# Patient Record
Sex: FEMALE | Race: WHITE
[De-identification: names, ages, dates, MRNs, and addresses within clinical notes are randomized per-mention and may not be internally consistent; named-entity substitution may affect disease eponyms.]

---

## 2017-01-23 ENCOUNTER — HOSPITAL ENCOUNTER (INPATIENT)
Dept: HOSPITAL 45 - C.EDB | Age: 27
LOS: 29 days | Discharge: OTHER INSTITUTION | DRG: 885 | End: 2017-02-21
Attending: PSYCHIATRY & NEUROLOGY | Admitting: PSYCHIATRY & NEUROLOGY
Payer: COMMERCIAL

## 2017-01-23 VITALS
WEIGHT: 131.62 LBS | WEIGHT: 131.62 LBS | HEIGHT: 60.98 IN | BODY MASS INDEX: 24.85 KG/M2 | HEIGHT: 60.98 IN | BODY MASS INDEX: 24.85 KG/M2

## 2017-01-23 VITALS — OXYGEN SATURATION: 97 %
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
## 2017-01-23 DIAGNOSIS — T24.019A: ICD-10-CM

## 2017-01-23 DIAGNOSIS — F43.10: ICD-10-CM

## 2017-01-23 DIAGNOSIS — T76.21XA: ICD-10-CM

## 2017-01-23 DIAGNOSIS — F10.21: ICD-10-CM
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
## 2017-01-23 DIAGNOSIS — F33.9: Primary | ICD-10-CM

## 2017-01-23 DIAGNOSIS — E03.9: ICD-10-CM

## 2017-01-23 DIAGNOSIS — F17.200: ICD-10-CM
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
## 2017-01-23 DIAGNOSIS — R45.851: ICD-10-CM

## 2017-01-23 LAB
ALBUMIN/GLOB SERPL: 1.4 {RATIO} (ref 0.9–2)
ALP SERPL-CCNC: 47 U/L (ref 45–117)
ALT SERPL-CCNC: 15 U/L (ref 12–78)
ANION GAP SERPL CALC-SCNC: 14 MMOL/L (ref 3–11)
APPEARANCE UR: CLEAR
AST SERPL-CCNC: 13 U/L (ref 15–37)
BASOPHILS # BLD: 0.02 K/UL (ref 0–0.2)
BASOPHILS NFR BLD: 0.3 %
BENZODIAZ UR-MCNC: (no result) UG/L
BILIRUB UR-MCNC: (no result) MG/DL
BUN SERPL-MCNC: 7 MG/DL (ref 7–18)
BUN/CREAT SERPL: 10.3 (ref 10–20)
CALCIUM SERPL-MCNC: 9 MG/DL (ref 8.5–10.1)
CHLORIDE SERPL-SCNC: 105 MMOL/L (ref 98–107)
CO2 SERPL-SCNC: 22 MMOL/L (ref 21–32)
COLOR UR: YELLOW
COMPLETE: YES
CREAT CL PREDICTED SERPL C-G-VRATE: 98.1 ML/MIN
CREAT SERPL-MCNC: 0.72 MG/DL (ref 0.6–1.2)
EOSINOPHIL NFR BLD AUTO: 194 K/UL (ref 130–400)
GLOBULIN SER-MCNC: 2.9 GM/DL (ref 2.5–4)
GLUCOSE SERPL-MCNC: 84 MG/DL (ref 70–99)
HCT VFR BLD CALC: 40.2 % (ref 37–47)
IG%: 0.3 %
IMM GRANULOCYTES NFR BLD AUTO: 23 %
LYMPHOCYTES # BLD: 1.74 K/UL (ref 1.2–3.4)
MANUAL MICROSCOPIC REQUIRED?: NO
MCH RBC QN AUTO: 31.8 PG (ref 25–34)
MCHC RBC AUTO-ENTMCNC: 34.6 G/DL (ref 32–36)
MCV RBC AUTO: 92 FL (ref 80–100)
MONOCYTES NFR BLD: 4.5 %
NEUTROPHILS # BLD AUTO: 1.1 %
NEUTROPHILS NFR BLD AUTO: 70.8 %
NITRITE UR QL STRIP: (no result)
PCP UR-MCNC: (no result) UG/L
PH UR STRIP: 6.5 [PH] (ref 4.5–7.5)
PMV BLD AUTO: 9.2 FL (ref 7.4–10.4)
POTASSIUM SERPL-SCNC: 3.7 MMOL/L (ref 3.5–5.1)
PREG INTERNAL NEGATIVE QC: (no result)
PREG INTERNAL POSITIVE QC: (no result)
RBC # BLD AUTO: 4.37 M/UL (ref 4.2–5.4)
REVIEW REQ?: NO
SODIUM SERPL-SCNC: 141 MMOL/L (ref 136–145)
SP GR UR STRIP: 1.01 (ref 1–1.03)
TSH SERPL-ACNC: 0.73 UIU/ML (ref 0.3–4.5)
URINE BILL WITH OR WITHOUT MIC: (no result)
UROBILINOGEN UR-MCNC: (no result) MG/DL
WBC # BLD AUTO: 7.56 K/UL (ref 4.8–10.8)

## 2017-01-23 RX ADMIN — QUETIAPINE SCH MG: 200 TABLET, FILM COATED ORAL at 23:59
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
## 2017-01-24 VITALS — HEART RATE: 96 BPM | SYSTOLIC BLOOD PRESSURE: 101 MMHG | DIASTOLIC BLOOD PRESSURE: 67 MMHG | TEMPERATURE: 98.24 F

## 2017-01-24 VITALS — DIASTOLIC BLOOD PRESSURE: 76 MMHG | SYSTOLIC BLOOD PRESSURE: 123 MMHG | TEMPERATURE: 98.24 F | HEART RATE: 108 BPM

## 2017-01-24 RX ADMIN — BACITRACIN ZINC, NEOMYCIN, POLYMYXIN B SCH APPLN: 400; 3.5; 5 OINTMENT TOPICAL at 21:55

## 2017-01-24 RX ADMIN — LORAZEPAM PRN MG: 0.5 TABLET ORAL at 21:55

## 2017-01-24 RX ADMIN — ALUMINUM ZIRCONIUM TRICHLOROHYDREX GLY SCH EA: 0.2 STICK TOPICAL at 08:00

## 2017-01-24 RX ADMIN — NICOTINE SCH PATCH: 21 PATCH, EXTENDED RELEASE TRANSDERMAL at 07:59

## 2017-01-24 RX ADMIN — LEVOTHYROXINE SODIUM SCH MCG: 75 TABLET ORAL at 07:58

## 2017-01-24 RX ADMIN — ALUMINUM ZIRCONIUM TRICHLOROHYDREX GLY SCH EA: 0.2 STICK TOPICAL at 00:00

## 2017-01-24 RX ADMIN — BACITRACIN ZINC, NEOMYCIN, POLYMYXIN B SCH APPLN: 400; 3.5; 5 OINTMENT TOPICAL at 07:59

## 2017-01-24 RX ADMIN — LORAZEPAM PRN MG: 0.5 TABLET ORAL at 11:11

## 2017-01-24 RX ADMIN — QUETIAPINE SCH MG: 200 TABLET, FILM COATED ORAL at 21:54

## 2017-01-24 RX ADMIN — ALUMINUM ZIRCONIUM TRICHLOROHYDREX GLY SCH EA: 0.2 STICK TOPICAL at 15:29

## 2017-01-24 RX ADMIN — LACTASE TAB 3000 UNIT PRN UNITS: 3000 TAB at 12:52

## 2017-01-24 RX ADMIN — NICOTINE POLACRILEX PRN PIECE: 2 GUM, CHEWING ORAL at 13:37

## 2017-01-24 RX ADMIN — LORAZEPAM PRN MG: 0.5 TABLET ORAL at 16:03
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
## 2017-01-25 VITALS — TEMPERATURE: 98.24 F | DIASTOLIC BLOOD PRESSURE: 69 MMHG | HEART RATE: 116 BPM | SYSTOLIC BLOOD PRESSURE: 111 MMHG

## 2017-01-25 RX ADMIN — LACTASE TAB 3000 UNIT PRN UNITS: 3000 TAB at 17:41

## 2017-01-25 RX ADMIN — NALTREXONE HYDROCHLORIDE SCH MG: 50 TABLET, FILM COATED ORAL at 21:40

## 2017-01-25 RX ADMIN — ALUMINUM ZIRCONIUM TRICHLOROHYDREX GLY SCH EA: 0.2 STICK TOPICAL at 07:53

## 2017-01-25 RX ADMIN — BACITRACIN ZINC, NEOMYCIN, POLYMYXIN B SCH APPLN: 400; 3.5; 5 OINTMENT TOPICAL at 08:37

## 2017-01-25 RX ADMIN — ALUMINUM ZIRCONIUM TRICHLOROHYDREX GLY SCH EA: 0.2 STICK TOPICAL at 15:52

## 2017-01-25 RX ADMIN — ALUMINUM ZIRCONIUM TRICHLOROHYDREX GLY SCH EA: 0.2 STICK TOPICAL at 00:00

## 2017-01-25 RX ADMIN — NICOTINE SCH PATCH: 21 PATCH, EXTENDED RELEASE TRANSDERMAL at 08:02

## 2017-01-25 RX ADMIN — QUETIAPINE SCH MG: 200 TABLET, FILM COATED ORAL at 21:40

## 2017-01-25 RX ADMIN — LORAZEPAM PRN MG: 0.5 TABLET ORAL at 14:22

## 2017-01-25 RX ADMIN — LORAZEPAM PRN MG: 0.5 TABLET ORAL at 08:03

## 2017-01-25 RX ADMIN — LORAZEPAM PRN MG: 0.5 TABLET ORAL at 21:40

## 2017-01-25 RX ADMIN — LEVOTHYROXINE SODIUM SCH MCG: 75 TABLET ORAL at 07:46

## 2017-01-25 RX ADMIN — LACTASE TAB 3000 UNIT PRN UNITS: 3000 TAB at 20:45
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
## 2017-01-26 VITALS — DIASTOLIC BLOOD PRESSURE: 61 MMHG | SYSTOLIC BLOOD PRESSURE: 100 MMHG | HEART RATE: 92 BPM | TEMPERATURE: 98.42 F

## 2017-01-26 LAB
CHOLEST/HDLC SERPL: 3.1 {RATIO}
GLUCOSE UR QL: 49 MG/DL
KETONES UR QL STRIP: 88 MG/DL
NITRITE UR QL STRIP: 74 MG/DL (ref 0–150)
PH UR: 152 MG/DL (ref 0–200)
VERY LOW DENSITY LIPOPROT CALC: 15 MG/DL

## 2017-01-26 RX ADMIN — ALUMINUM ZIRCONIUM TRICHLOROHYDREX GLY SCH EA: 0.2 STICK TOPICAL at 15:41

## 2017-01-26 RX ADMIN — NALTREXONE HYDROCHLORIDE SCH MG: 50 TABLET, FILM COATED ORAL at 21:55

## 2017-01-26 RX ADMIN — LEVOTHYROXINE SODIUM SCH MCG: 75 TABLET ORAL at 08:19

## 2017-01-26 RX ADMIN — ALUMINUM ZIRCONIUM TRICHLOROHYDREX GLY SCH EA: 0.2 STICK TOPICAL at 08:00

## 2017-01-26 RX ADMIN — LACTASE TAB 3000 UNIT PRN UNITS: 3000 TAB at 12:53

## 2017-01-26 RX ADMIN — QUETIAPINE SCH MG: 200 TABLET, FILM COATED ORAL at 21:56

## 2017-01-26 RX ADMIN — ALUMINUM ZIRCONIUM TRICHLOROHYDREX GLY SCH EA: 0.2 STICK TOPICAL at 00:00

## 2017-01-26 RX ADMIN — LACTASE TAB 3000 UNIT PRN UNITS: 3000 TAB at 17:27

## 2017-01-26 RX ADMIN — LORAZEPAM PRN MG: 0.5 TABLET ORAL at 10:05

## 2017-01-26 RX ADMIN — BUPROPION HYDROCHLORIDE SCH MG: 150 TABLET, FILM COATED, EXTENDED RELEASE ORAL at 10:04

## 2017-01-26 RX ADMIN — NICOTINE SCH PATCH: 21 PATCH, EXTENDED RELEASE TRANSDERMAL at 10:03
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
## 2017-01-27 VITALS — DIASTOLIC BLOOD PRESSURE: 66 MMHG | HEART RATE: 105 BPM | SYSTOLIC BLOOD PRESSURE: 107 MMHG | TEMPERATURE: 98.42 F

## 2017-01-27 RX ADMIN — LACTASE TAB 3000 UNIT PRN UNITS: 3000 TAB at 13:03

## 2017-01-27 RX ADMIN — LEVOTHYROXINE SODIUM SCH MCG: 75 TABLET ORAL at 08:11

## 2017-01-27 RX ADMIN — BUPROPION HYDROCHLORIDE SCH MG: 150 TABLET, FILM COATED, EXTENDED RELEASE ORAL at 08:53

## 2017-01-27 RX ADMIN — QUETIAPINE SCH MG: 200 TABLET, FILM COATED ORAL at 21:22

## 2017-01-27 RX ADMIN — NICOTINE SCH PATCH: 21 PATCH, EXTENDED RELEASE TRANSDERMAL at 08:53

## 2017-01-27 RX ADMIN — LORAZEPAM PRN MG: 0.5 TABLET ORAL at 14:39

## 2017-01-27 RX ADMIN — LACTASE TAB 3000 UNIT PRN UNITS: 3000 TAB at 17:18
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
## 2017-01-28 VITALS — DIASTOLIC BLOOD PRESSURE: 67 MMHG | SYSTOLIC BLOOD PRESSURE: 100 MMHG | TEMPERATURE: 98.42 F | HEART RATE: 95 BPM

## 2017-01-28 RX ADMIN — LACTASE TAB 3000 UNIT PRN UNITS: 3000 TAB at 20:32

## 2017-01-28 RX ADMIN — BUPROPION HYDROCHLORIDE SCH MG: 150 TABLET, FILM COATED, EXTENDED RELEASE ORAL at 08:02

## 2017-01-28 RX ADMIN — LORAZEPAM PRN MG: 0.5 TABLET ORAL at 10:52

## 2017-01-28 RX ADMIN — LEVOTHYROXINE SODIUM SCH MCG: 75 TABLET ORAL at 07:53

## 2017-01-28 RX ADMIN — QUETIAPINE SCH MG: 200 TABLET, FILM COATED ORAL at 22:03

## 2017-01-28 RX ADMIN — NICOTINE SCH PATCH: 21 PATCH, EXTENDED RELEASE TRANSDERMAL at 07:55

## 2017-01-28 RX ADMIN — NICOTINE POLACRILEX PRN PIECE: 2 GUM, CHEWING ORAL at 18:34

## 2017-01-28 RX ADMIN — LACTASE TAB 3000 UNIT PRN UNITS: 3000 TAB at 13:44

## 2017-01-28 RX ADMIN — NALTREXONE HYDROCHLORIDE SCH MG: 50 TABLET, FILM COATED ORAL at 22:06
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
## 2017-01-29 VITALS — HEART RATE: 90 BPM | TEMPERATURE: 98.6 F | DIASTOLIC BLOOD PRESSURE: 58 MMHG | SYSTOLIC BLOOD PRESSURE: 92 MMHG

## 2017-01-29 RX ADMIN — LEVOTHYROXINE SODIUM SCH MCG: 75 TABLET ORAL at 07:47

## 2017-01-29 RX ADMIN — NICOTINE SCH PATCH: 21 PATCH, EXTENDED RELEASE TRANSDERMAL at 08:16

## 2017-01-29 RX ADMIN — NALTREXONE HYDROCHLORIDE SCH MG: 50 TABLET, FILM COATED ORAL at 21:34

## 2017-01-29 RX ADMIN — BUPROPION HYDROCHLORIDE SCH MG: 150 TABLET, FILM COATED, EXTENDED RELEASE ORAL at 08:18

## 2017-01-29 RX ADMIN — LORAZEPAM PRN MG: 0.5 TABLET ORAL at 15:24

## 2017-01-29 RX ADMIN — LACTASE TAB 3000 UNIT PRN UNITS: 3000 TAB at 12:37

## 2017-01-29 RX ADMIN — QUETIAPINE SCH MG: 200 TABLET, FILM COATED ORAL at 21:34

## 2017-01-29 RX ADMIN — LORAZEPAM PRN MG: 0.5 TABLET ORAL at 11:03
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
## 2017-01-30 VITALS — HEART RATE: 91 BPM | TEMPERATURE: 98.42 F | DIASTOLIC BLOOD PRESSURE: 72 MMHG | SYSTOLIC BLOOD PRESSURE: 112 MMHG

## 2017-01-30 RX ADMIN — LEVOTHYROXINE SODIUM SCH MCG: 75 TABLET ORAL at 07:33

## 2017-01-30 RX ADMIN — BUPROPION HYDROCHLORIDE SCH MG: 150 TABLET, FILM COATED, EXTENDED RELEASE ORAL at 07:33

## 2017-01-30 RX ADMIN — NICOTINE SCH PATCH: 21 PATCH, EXTENDED RELEASE TRANSDERMAL at 07:32

## 2017-01-30 RX ADMIN — LORAZEPAM PRN MG: 0.5 TABLET ORAL at 17:38

## 2017-01-30 RX ADMIN — LACTASE TAB 3000 UNIT PRN UNITS: 3000 TAB at 17:34

## 2017-01-30 RX ADMIN — QUETIAPINE SCH MG: 200 TABLET, FILM COATED ORAL at 21:16

## 2017-01-30 RX ADMIN — NALTREXONE HYDROCHLORIDE SCH MG: 50 TABLET, FILM COATED ORAL at 21:15
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
## 2017-01-31 VITALS — TEMPERATURE: 98.42 F | SYSTOLIC BLOOD PRESSURE: 138 MMHG | HEART RATE: 103 BPM | DIASTOLIC BLOOD PRESSURE: 75 MMHG

## 2017-01-31 RX ADMIN — NALTREXONE HYDROCHLORIDE SCH MG: 50 TABLET, FILM COATED ORAL at 21:24

## 2017-01-31 RX ADMIN — LEVOTHYROXINE SODIUM SCH MCG: 75 TABLET ORAL at 08:12

## 2017-01-31 RX ADMIN — QUETIAPINE SCH MG: 200 TABLET, FILM COATED ORAL at 21:24

## 2017-01-31 RX ADMIN — LACTASE TAB 3000 UNIT PRN UNITS: 3000 TAB at 20:26

## 2017-01-31 RX ADMIN — NICOTINE POLACRILEX PRN PIECE: 2 GUM, CHEWING ORAL at 14:57

## 2017-01-31 RX ADMIN — NICOTINE SCH PATCH: 21 PATCH, EXTENDED RELEASE TRANSDERMAL at 08:11

## 2017-01-31 RX ADMIN — LORAZEPAM PRN MG: 0.5 TABLET ORAL at 17:36

## 2017-01-31 RX ADMIN — LACTASE TAB 3000 UNIT PRN UNITS: 3000 TAB at 12:42

## 2017-01-31 RX ADMIN — BUPROPION HYDROCHLORIDE SCH MG: 150 TABLET, FILM COATED, EXTENDED RELEASE ORAL at 08:14
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
## 2017-02-01 VITALS — TEMPERATURE: 98.6 F | SYSTOLIC BLOOD PRESSURE: 85 MMHG | DIASTOLIC BLOOD PRESSURE: 61 MMHG | HEART RATE: 85 BPM

## 2017-02-01 RX ADMIN — LACTASE TAB 3000 UNIT PRN UNITS: 3000 TAB at 12:49

## 2017-02-01 RX ADMIN — LACTASE TAB 3000 UNIT PRN UNITS: 3000 TAB at 17:22

## 2017-02-01 RX ADMIN — BUPROPION HYDROCHLORIDE SCH MG: 150 TABLET, FILM COATED, EXTENDED RELEASE ORAL at 08:59

## 2017-02-01 RX ADMIN — NICOTINE POLACRILEX PRN PIECE: 2 GUM, CHEWING ORAL at 17:48

## 2017-02-01 RX ADMIN — NICOTINE SCH PATCH: 21 PATCH, EXTENDED RELEASE TRANSDERMAL at 08:57

## 2017-02-01 RX ADMIN — LORAZEPAM PRN MG: 0.5 TABLET ORAL at 14:38

## 2017-02-01 RX ADMIN — QUETIAPINE SCH MG: 200 TABLET, FILM COATED ORAL at 21:38

## 2017-02-01 RX ADMIN — NALTREXONE HYDROCHLORIDE SCH MG: 50 TABLET, FILM COATED ORAL at 21:38

## 2017-02-01 RX ADMIN — LEVOTHYROXINE SODIUM SCH MCG: 75 TABLET ORAL at 07:45
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
## 2017-02-02 VITALS — SYSTOLIC BLOOD PRESSURE: 99 MMHG | HEART RATE: 99 BPM | TEMPERATURE: 98.06 F | DIASTOLIC BLOOD PRESSURE: 59 MMHG

## 2017-02-02 RX ADMIN — LORAZEPAM PRN MG: 1 TABLET ORAL at 07:44

## 2017-02-02 RX ADMIN — LACTASE TAB 3000 UNIT PRN UNITS: 3000 TAB at 17:18

## 2017-02-02 RX ADMIN — NICOTINE SCH PATCH: 21 PATCH, EXTENDED RELEASE TRANSDERMAL at 08:42

## 2017-02-02 RX ADMIN — QUETIAPINE SCH MG: 200 TABLET, FILM COATED ORAL at 20:55

## 2017-02-02 RX ADMIN — NALTREXONE HYDROCHLORIDE SCH MG: 50 TABLET, FILM COATED ORAL at 20:55

## 2017-02-02 RX ADMIN — LORAZEPAM PRN MG: 1 TABLET ORAL at 14:15

## 2017-02-02 RX ADMIN — LEVOTHYROXINE SODIUM SCH MCG: 75 TABLET ORAL at 07:21

## 2017-02-02 RX ADMIN — BUPROPION HYDROCHLORIDE SCH MG: 150 TABLET, FILM COATED, EXTENDED RELEASE ORAL at 08:44
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
## 2017-02-03 VITALS — HEART RATE: 108 BPM | SYSTOLIC BLOOD PRESSURE: 103 MMHG | TEMPERATURE: 98.78 F | DIASTOLIC BLOOD PRESSURE: 69 MMHG

## 2017-02-03 RX ADMIN — LEVOTHYROXINE SODIUM SCH MCG: 75 TABLET ORAL at 08:01

## 2017-02-03 RX ADMIN — LORAZEPAM PRN MG: 1 TABLET ORAL at 15:40

## 2017-02-03 RX ADMIN — NALTREXONE HYDROCHLORIDE SCH MG: 50 TABLET, FILM COATED ORAL at 21:28

## 2017-02-03 RX ADMIN — QUETIAPINE SCH MG: 200 TABLET, FILM COATED ORAL at 21:28

## 2017-02-03 RX ADMIN — BUPROPION HYDROCHLORIDE SCH MG: 150 TABLET, FILM COATED, EXTENDED RELEASE ORAL at 08:03

## 2017-02-03 RX ADMIN — NICOTINE SCH PATCH: 21 PATCH, EXTENDED RELEASE TRANSDERMAL at 08:03

## 2017-02-03 RX ADMIN — LORAZEPAM PRN MG: 1 TABLET ORAL at 10:48

## 2017-02-03 RX ADMIN — LACTASE TAB 3000 UNIT PRN UNITS: 3000 TAB at 12:59
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
## 2017-02-04 VITALS — HEART RATE: 96 BPM | SYSTOLIC BLOOD PRESSURE: 110 MMHG | DIASTOLIC BLOOD PRESSURE: 71 MMHG | TEMPERATURE: 98.6 F

## 2017-02-04 RX ADMIN — BUPROPION HYDROCHLORIDE SCH MG: 150 TABLET, FILM COATED, EXTENDED RELEASE ORAL at 08:04

## 2017-02-04 RX ADMIN — LORAZEPAM PRN MG: 1 TABLET ORAL at 10:57

## 2017-02-04 RX ADMIN — LORAZEPAM PRN MG: 1 TABLET ORAL at 18:56

## 2017-02-04 RX ADMIN — LEVOTHYROXINE SODIUM SCH MCG: 75 TABLET ORAL at 08:01

## 2017-02-04 RX ADMIN — NICOTINE SCH PATCH: 21 PATCH, EXTENDED RELEASE TRANSDERMAL at 08:03

## 2017-02-04 RX ADMIN — NALTREXONE HYDROCHLORIDE SCH MG: 50 TABLET, FILM COATED ORAL at 21:09

## 2017-02-04 RX ADMIN — LACTASE TAB 3000 UNIT PRN UNITS: 3000 TAB at 17:04

## 2017-02-04 RX ADMIN — QUETIAPINE SCH MG: 200 TABLET, FILM COATED ORAL at 21:10
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
## 2017-02-05 VITALS — TEMPERATURE: 98.78 F | DIASTOLIC BLOOD PRESSURE: 68 MMHG | HEART RATE: 111 BPM | SYSTOLIC BLOOD PRESSURE: 93 MMHG

## 2017-02-05 RX ADMIN — LEVOTHYROXINE SODIUM SCH MCG: 75 TABLET ORAL at 08:24

## 2017-02-05 RX ADMIN — NALTREXONE HYDROCHLORIDE SCH MG: 50 TABLET, FILM COATED ORAL at 21:11

## 2017-02-05 RX ADMIN — LORAZEPAM PRN MG: 1 TABLET ORAL at 13:30

## 2017-02-05 RX ADMIN — LACTASE TAB 3000 UNIT PRN UNITS: 3000 TAB at 17:26

## 2017-02-05 RX ADMIN — BUPROPION HYDROCHLORIDE SCH MG: 150 TABLET, FILM COATED, EXTENDED RELEASE ORAL at 08:25

## 2017-02-05 RX ADMIN — QUETIAPINE SCH MG: 200 TABLET, FILM COATED ORAL at 21:11

## 2017-02-05 RX ADMIN — LACTASE TAB 3000 UNIT PRN UNITS: 3000 TAB at 12:57

## 2017-02-05 RX ADMIN — NICOTINE SCH PATCH: 21 PATCH, EXTENDED RELEASE TRANSDERMAL at 08:24
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
## 2017-02-06 VITALS — DIASTOLIC BLOOD PRESSURE: 77 MMHG | HEART RATE: 90 BPM | SYSTOLIC BLOOD PRESSURE: 111 MMHG | TEMPERATURE: 98.6 F

## 2017-02-06 RX ADMIN — LEVOTHYROXINE SODIUM SCH MCG: 75 TABLET ORAL at 07:46

## 2017-02-06 RX ADMIN — NALTREXONE HYDROCHLORIDE SCH MG: 50 TABLET, FILM COATED ORAL at 21:22

## 2017-02-06 RX ADMIN — NICOTINE SCH PATCH: 21 PATCH, EXTENDED RELEASE TRANSDERMAL at 08:20

## 2017-02-06 RX ADMIN — BUPROPION HYDROCHLORIDE SCH MG: 150 TABLET, FILM COATED, EXTENDED RELEASE ORAL at 08:18

## 2017-02-06 RX ADMIN — QUETIAPINE SCH MG: 200 TABLET, FILM COATED ORAL at 21:21

## 2017-02-06 RX ADMIN — LORAZEPAM PRN MG: 1 TABLET ORAL at 15:23

## 2017-02-06 RX ADMIN — LORAZEPAM PRN MG: 1 TABLET ORAL at 08:23
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
## 2017-02-07 VITALS — SYSTOLIC BLOOD PRESSURE: 104 MMHG | TEMPERATURE: 98.42 F | HEART RATE: 100 BPM | DIASTOLIC BLOOD PRESSURE: 64 MMHG

## 2017-02-07 RX ADMIN — LACTASE TAB 3000 UNIT PRN UNITS: 3000 TAB at 12:47

## 2017-02-07 RX ADMIN — BUPROPION HYDROCHLORIDE SCH MG: 150 TABLET, FILM COATED, EXTENDED RELEASE ORAL at 08:02

## 2017-02-07 RX ADMIN — LEVOTHYROXINE SODIUM SCH MCG: 75 TABLET ORAL at 07:00

## 2017-02-07 RX ADMIN — LACTASE TAB 3000 UNIT PRN UNITS: 3000 TAB at 17:20

## 2017-02-07 RX ADMIN — LORAZEPAM PRN MG: 1 TABLET ORAL at 09:36

## 2017-02-07 RX ADMIN — LORAZEPAM PRN MG: 1 TABLET ORAL at 17:10

## 2017-02-07 RX ADMIN — NALTREXONE HYDROCHLORIDE SCH MG: 50 TABLET, FILM COATED ORAL at 21:16

## 2017-02-07 RX ADMIN — QUETIAPINE SCH MG: 200 TABLET, FILM COATED ORAL at 21:16

## 2017-02-07 RX ADMIN — NICOTINE SCH PATCH: 21 PATCH, EXTENDED RELEASE TRANSDERMAL at 08:02
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
## 2017-02-08 VITALS — TEMPERATURE: 97.52 F | DIASTOLIC BLOOD PRESSURE: 67 MMHG | HEART RATE: 65 BPM | SYSTOLIC BLOOD PRESSURE: 93 MMHG

## 2017-02-08 RX ADMIN — NICOTINE SCH PATCH: 21 PATCH, EXTENDED RELEASE TRANSDERMAL at 08:53

## 2017-02-08 RX ADMIN — LORAZEPAM PRN MG: 1 TABLET ORAL at 10:35

## 2017-02-08 RX ADMIN — LEVOTHYROXINE SODIUM SCH MCG: 75 TABLET ORAL at 07:43

## 2017-02-08 RX ADMIN — NALTREXONE HYDROCHLORIDE SCH MG: 50 TABLET, FILM COATED ORAL at 21:05

## 2017-02-08 RX ADMIN — QUETIAPINE SCH MG: 200 TABLET, FILM COATED ORAL at 21:06

## 2017-02-08 RX ADMIN — LACTASE TAB 3000 UNIT PRN UNITS: 3000 TAB at 12:48

## 2017-02-08 RX ADMIN — BUPROPION HYDROCHLORIDE SCH MG: 150 TABLET, FILM COATED, EXTENDED RELEASE ORAL at 08:55

## 2017-02-08 RX ADMIN — LACTASE TAB 3000 UNIT PRN UNITS: 3000 TAB at 17:24
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
## 2017-02-09 VITALS — TEMPERATURE: 98.24 F | DIASTOLIC BLOOD PRESSURE: 78 MMHG | HEART RATE: 84 BPM | SYSTOLIC BLOOD PRESSURE: 113 MMHG

## 2017-02-09 RX ADMIN — QUETIAPINE SCH MG: 200 TABLET, FILM COATED ORAL at 20:55

## 2017-02-09 RX ADMIN — NALTREXONE HYDROCHLORIDE SCH MG: 50 TABLET, FILM COATED ORAL at 20:54

## 2017-02-09 RX ADMIN — LEVOTHYROXINE SODIUM SCH MCG: 75 TABLET ORAL at 07:42

## 2017-02-09 RX ADMIN — LORAZEPAM PRN MG: 1 TABLET ORAL at 19:06

## 2017-02-09 RX ADMIN — LORAZEPAM PRN MG: 1 TABLET ORAL at 15:06

## 2017-02-09 RX ADMIN — NICOTINE SCH PATCH: 21 PATCH, EXTENDED RELEASE TRANSDERMAL at 07:43

## 2017-02-09 RX ADMIN — BUPROPION HYDROCHLORIDE SCH MG: 150 TABLET, FILM COATED, EXTENDED RELEASE ORAL at 07:44
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
## 2017-02-10 VITALS — DIASTOLIC BLOOD PRESSURE: 70 MMHG | TEMPERATURE: 98.42 F | HEART RATE: 98 BPM | SYSTOLIC BLOOD PRESSURE: 100 MMHG

## 2017-02-10 RX ADMIN — NICOTINE SCH PATCH: 21 PATCH, EXTENDED RELEASE TRANSDERMAL at 08:24

## 2017-02-10 RX ADMIN — LORAZEPAM PRN MG: 1 TABLET ORAL at 17:47

## 2017-02-10 RX ADMIN — QUETIAPINE SCH MG: 200 TABLET, FILM COATED ORAL at 21:16

## 2017-02-10 RX ADMIN — LORAZEPAM PRN MG: 1 TABLET ORAL at 12:28

## 2017-02-10 RX ADMIN — LEVOTHYROXINE SODIUM SCH MCG: 75 TABLET ORAL at 08:24

## 2017-02-10 RX ADMIN — NALTREXONE HYDROCHLORIDE SCH MG: 50 TABLET, FILM COATED ORAL at 21:15

## 2017-02-10 RX ADMIN — BUPROPION HYDROCHLORIDE SCH MG: 150 TABLET, FILM COATED, EXTENDED RELEASE ORAL at 08:24
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
## 2017-02-11 VITALS — HEART RATE: 101 BPM | TEMPERATURE: 98.42 F | DIASTOLIC BLOOD PRESSURE: 72 MMHG | SYSTOLIC BLOOD PRESSURE: 104 MMHG

## 2017-02-11 RX ADMIN — NALTREXONE HYDROCHLORIDE SCH MG: 50 TABLET, FILM COATED ORAL at 21:31

## 2017-02-11 RX ADMIN — LEVOTHYROXINE SODIUM SCH MCG: 75 TABLET ORAL at 07:52

## 2017-02-11 RX ADMIN — LACTASE TAB 3000 UNIT PRN UNITS: 3000 TAB at 17:21

## 2017-02-11 RX ADMIN — LORAZEPAM PRN MG: 1 TABLET ORAL at 21:31

## 2017-02-11 RX ADMIN — LORAZEPAM PRN MG: 1 TABLET ORAL at 09:03

## 2017-02-11 RX ADMIN — QUETIAPINE SCH MG: 200 TABLET, FILM COATED ORAL at 21:37

## 2017-02-11 RX ADMIN — NICOTINE SCH PATCH: 21 PATCH, EXTENDED RELEASE TRANSDERMAL at 09:01

## 2017-02-11 RX ADMIN — BUPROPION HYDROCHLORIDE SCH MG: 150 TABLET, FILM COATED, EXTENDED RELEASE ORAL at 09:02
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
## 2017-02-12 VITALS — HEART RATE: 90 BPM | SYSTOLIC BLOOD PRESSURE: 112 MMHG | TEMPERATURE: 98.24 F | DIASTOLIC BLOOD PRESSURE: 77 MMHG

## 2017-02-12 RX ADMIN — LORAZEPAM PRN MG: 1 TABLET ORAL at 09:17

## 2017-02-12 RX ADMIN — BUPROPION HYDROCHLORIDE SCH MG: 150 TABLET, FILM COATED, EXTENDED RELEASE ORAL at 08:43

## 2017-02-12 RX ADMIN — LEVOTHYROXINE SODIUM SCH MCG: 75 TABLET ORAL at 08:42

## 2017-02-12 RX ADMIN — NALTREXONE HYDROCHLORIDE SCH MG: 50 TABLET, FILM COATED ORAL at 21:20

## 2017-02-12 RX ADMIN — NICOTINE SCH PATCH: 21 PATCH, EXTENDED RELEASE TRANSDERMAL at 08:42

## 2017-02-12 RX ADMIN — LORAZEPAM PRN MG: 1 TABLET ORAL at 17:32
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
## 2017-02-13 VITALS — HEART RATE: 97 BPM | TEMPERATURE: 98.24 F | SYSTOLIC BLOOD PRESSURE: 104 MMHG | DIASTOLIC BLOOD PRESSURE: 72 MMHG

## 2017-02-13 RX ADMIN — LACTASE TAB 3000 UNIT PRN UNITS: 3000 TAB at 17:17

## 2017-02-13 RX ADMIN — NICOTINE SCH PATCH: 21 PATCH, EXTENDED RELEASE TRANSDERMAL at 08:31

## 2017-02-13 RX ADMIN — LACTASE TAB 3000 UNIT PRN UNITS: 3000 TAB at 12:42

## 2017-02-13 RX ADMIN — BUPROPION HYDROCHLORIDE SCH MG: 150 TABLET, FILM COATED, EXTENDED RELEASE ORAL at 08:32

## 2017-02-13 RX ADMIN — LEVOTHYROXINE SODIUM SCH MCG: 75 TABLET ORAL at 07:41

## 2017-02-13 RX ADMIN — NALTREXONE HYDROCHLORIDE SCH MG: 50 TABLET, FILM COATED ORAL at 21:01

## 2017-02-13 RX ADMIN — LORAZEPAM PRN MG: 1 TABLET ORAL at 19:36

## 2017-02-13 RX ADMIN — QUETIAPINE SCH MG: 200 TABLET, FILM COATED ORAL at 21:02
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
## 2017-02-14 VITALS — HEART RATE: 101 BPM | SYSTOLIC BLOOD PRESSURE: 105 MMHG | TEMPERATURE: 98.24 F | DIASTOLIC BLOOD PRESSURE: 69 MMHG

## 2017-02-14 RX ADMIN — LACTASE TAB 3000 UNIT PRN UNITS: 3000 TAB at 12:57

## 2017-02-14 RX ADMIN — LEVOTHYROXINE SODIUM SCH MCG: 75 TABLET ORAL at 08:04

## 2017-02-14 RX ADMIN — LORAZEPAM PRN MG: 1 TABLET ORAL at 17:15

## 2017-02-14 RX ADMIN — NALTREXONE HYDROCHLORIDE SCH MG: 50 TABLET, FILM COATED ORAL at 21:44

## 2017-02-14 RX ADMIN — BUPROPION HYDROCHLORIDE SCH MG: 300 TABLET, FILM COATED, EXTENDED RELEASE ORAL at 08:05

## 2017-02-14 RX ADMIN — QUETIAPINE SCH MG: 200 TABLET, FILM COATED ORAL at 21:43

## 2017-02-14 RX ADMIN — NICOTINE SCH PATCH: 21 PATCH, EXTENDED RELEASE TRANSDERMAL at 08:05

## 2017-02-14 RX ADMIN — LACTASE TAB 3000 UNIT PRN UNITS: 3000 TAB at 17:58

## 2017-02-14 RX ADMIN — LORAZEPAM PRN MG: 1 TABLET ORAL at 09:11
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
## 2017-02-15 VITALS — HEART RATE: 83 BPM | TEMPERATURE: 98.24 F | DIASTOLIC BLOOD PRESSURE: 66 MMHG | SYSTOLIC BLOOD PRESSURE: 103 MMHG

## 2017-02-15 RX ADMIN — NALTREXONE HYDROCHLORIDE SCH MG: 50 TABLET, FILM COATED ORAL at 21:40

## 2017-02-15 RX ADMIN — NICOTINE SCH PATCH: 21 PATCH, EXTENDED RELEASE TRANSDERMAL at 08:44

## 2017-02-15 RX ADMIN — BUPROPION HYDROCHLORIDE SCH MG: 300 TABLET, FILM COATED, EXTENDED RELEASE ORAL at 08:44

## 2017-02-15 RX ADMIN — LORAZEPAM PRN MG: 1 TABLET ORAL at 11:02

## 2017-02-15 RX ADMIN — QUETIAPINE SCH MG: 200 TABLET, FILM COATED ORAL at 21:40

## 2017-02-15 RX ADMIN — LEVOTHYROXINE SODIUM SCH MCG: 75 TABLET ORAL at 07:41

## 2017-02-15 RX ADMIN — LORAZEPAM PRN MG: 1 TABLET ORAL at 16:02
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
## 2017-02-16 VITALS — SYSTOLIC BLOOD PRESSURE: 89 MMHG | HEART RATE: 99 BPM | DIASTOLIC BLOOD PRESSURE: 61 MMHG | TEMPERATURE: 98.24 F

## 2017-02-16 RX ADMIN — NALTREXONE HYDROCHLORIDE SCH MG: 50 TABLET, FILM COATED ORAL at 21:34

## 2017-02-16 RX ADMIN — BUPROPION HYDROCHLORIDE SCH MG: 300 TABLET, FILM COATED, EXTENDED RELEASE ORAL at 08:46

## 2017-02-16 RX ADMIN — LEVOTHYROXINE SODIUM SCH MCG: 75 TABLET ORAL at 08:46

## 2017-02-16 RX ADMIN — NICOTINE SCH PATCH: 21 PATCH, EXTENDED RELEASE TRANSDERMAL at 08:46

## 2017-02-16 RX ADMIN — QUETIAPINE SCH MG: 200 TABLET, FILM COATED ORAL at 21:34

## 2017-02-16 RX ADMIN — LORAZEPAM PRN MG: 1 TABLET ORAL at 11:51
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
## 2017-02-17 VITALS — SYSTOLIC BLOOD PRESSURE: 96 MMHG | DIASTOLIC BLOOD PRESSURE: 56 MMHG | TEMPERATURE: 98.42 F | HEART RATE: 83 BPM

## 2017-02-17 RX ADMIN — LORAZEPAM PRN MG: 1 TABLET ORAL at 13:39

## 2017-02-17 RX ADMIN — QUETIAPINE SCH MG: 200 TABLET, FILM COATED ORAL at 21:27

## 2017-02-17 RX ADMIN — LACTASE TAB 3000 UNIT PRN UNITS: 3000 TAB at 13:02

## 2017-02-17 RX ADMIN — BUPROPION HYDROCHLORIDE SCH MG: 150 TABLET, FILM COATED, EXTENDED RELEASE ORAL at 08:10

## 2017-02-17 RX ADMIN — NICOTINE SCH PATCH: 21 PATCH, EXTENDED RELEASE TRANSDERMAL at 08:08

## 2017-02-17 RX ADMIN — LACTASE TAB 3000 UNIT PRN UNITS: 3000 TAB at 17:13

## 2017-02-17 RX ADMIN — LEVOTHYROXINE SODIUM SCH MCG: 75 TABLET ORAL at 08:08
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
## 2017-02-18 VITALS — SYSTOLIC BLOOD PRESSURE: 100 MMHG | TEMPERATURE: 97.7 F | HEART RATE: 76 BPM | DIASTOLIC BLOOD PRESSURE: 64 MMHG

## 2017-02-18 RX ADMIN — LACTASE TAB 3000 UNIT PRN UNITS: 3000 TAB at 13:05

## 2017-02-18 RX ADMIN — BUPROPION HYDROCHLORIDE SCH MG: 150 TABLET, FILM COATED, EXTENDED RELEASE ORAL at 08:42

## 2017-02-18 RX ADMIN — NICOTINE SCH PATCH: 21 PATCH, EXTENDED RELEASE TRANSDERMAL at 08:42

## 2017-02-18 RX ADMIN — LEVOTHYROXINE SODIUM SCH MCG: 75 TABLET ORAL at 08:42

## 2017-02-18 RX ADMIN — LORAZEPAM PRN MG: 1 TABLET ORAL at 13:28

## 2017-02-18 RX ADMIN — LORAZEPAM PRN MG: 1 TABLET ORAL at 17:56

## 2017-02-18 RX ADMIN — QUETIAPINE FUMARATE SCH MG: 300 TABLET, FILM COATED ORAL at 21:25
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
## 2017-02-19 VITALS — HEART RATE: 101 BPM | DIASTOLIC BLOOD PRESSURE: 70 MMHG | TEMPERATURE: 98.24 F | SYSTOLIC BLOOD PRESSURE: 107 MMHG

## 2017-02-19 RX ADMIN — LACTASE TAB 3000 UNIT PRN UNITS: 3000 TAB at 17:17

## 2017-02-19 RX ADMIN — BUPROPION HYDROCHLORIDE SCH MG: 150 TABLET, FILM COATED, EXTENDED RELEASE ORAL at 08:53

## 2017-02-19 RX ADMIN — LORAZEPAM PRN MG: 1 TABLET ORAL at 13:55

## 2017-02-19 RX ADMIN — NICOTINE SCH PATCH: 21 PATCH, EXTENDED RELEASE TRANSDERMAL at 08:54

## 2017-02-19 RX ADMIN — LORAZEPAM PRN MG: 1 TABLET ORAL at 21:07

## 2017-02-19 RX ADMIN — QUETIAPINE FUMARATE SCH MG: 300 TABLET, FILM COATED ORAL at 21:07

## 2017-02-19 RX ADMIN — LEVOTHYROXINE SODIUM SCH MCG: 75 TABLET ORAL at 08:53
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
## 2017-02-20 VITALS — SYSTOLIC BLOOD PRESSURE: 104 MMHG | HEART RATE: 87 BPM | DIASTOLIC BLOOD PRESSURE: 67 MMHG | TEMPERATURE: 98.24 F

## 2017-02-20 RX ADMIN — LEVOTHYROXINE SODIUM SCH MCG: 75 TABLET ORAL at 07:55

## 2017-02-20 RX ADMIN — LORAZEPAM PRN MG: 1 TABLET ORAL at 22:57

## 2017-02-20 RX ADMIN — QUETIAPINE FUMARATE SCH MG: 300 TABLET, FILM COATED ORAL at 21:35

## 2017-02-20 RX ADMIN — BUPROPION HYDROCHLORIDE SCH MG: 300 TABLET, FILM COATED, EXTENDED RELEASE ORAL at 08:48

## 2017-02-20 RX ADMIN — NICOTINE SCH PATCH: 21 PATCH, EXTENDED RELEASE TRANSDERMAL at 08:48
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
## 2017-02-21 VITALS — TEMPERATURE: 98.24 F | DIASTOLIC BLOOD PRESSURE: 66 MMHG | HEART RATE: 76 BPM | SYSTOLIC BLOOD PRESSURE: 108 MMHG

## 2017-02-21 RX ADMIN — LACTASE TAB 3000 UNIT PRN UNITS: 3000 TAB at 13:09

## 2017-02-21 RX ADMIN — LEVOTHYROXINE SODIUM SCH MCG: 75 TABLET ORAL at 08:16

## 2017-02-21 RX ADMIN — NICOTINE SCH PATCH: 21 PATCH, EXTENDED RELEASE TRANSDERMAL at 08:16

## 2017-02-21 RX ADMIN — BUPROPION HYDROCHLORIDE SCH MG: 300 TABLET, FILM COATED, EXTENDED RELEASE ORAL at 08:16

## 2017-02-21 NOTE — DISCHARGE INSTRUCTIONS
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 approved Prescription:  patient refused





Problem Qualifiers





(1) Burn of thigh:  


Encounter type:  subsequent encounter  Laterality:  right  Burn degree:  

unspecified degree  Qualified Codes:  T24.011D - Burn of unspecified degree of 

right thigh, subsequent encounter


(2) Nicotine dependence:  


Nicotine product type:  cigarettes  Substance use status:  uncomplicated  

Qualified Codes:  F17.210 - Nicotine dependence, cigarettes, uncomplicated

## 2017-02-22 ENCOUNTER — LAB REQUISITION (OUTPATIENT)
Dept: ADMINISTRATIVE | Age: 27
End: 2017-02-22
Payer: COMMERCIAL

## 2017-02-22 DIAGNOSIS — F33.2 SEVERE RECURRENT MAJOR DEPRESSION WITHOUT PSYCHOTIC FEATURES (HCC): ICD-10-CM

## 2017-02-22 LAB
B-HCG UR QL: NEGATIVE
BILIRUB UR QL: NEGATIVE
COLOR UR: YELLOW
GLUCOSE UR-MCNC: NEGATIVE MG/DL
LEUKOCYTE ESTERASE UR QL STRIP.AUTO: NEGATIVE
NITRITE UR QL STRIP.AUTO: NEGATIVE
PH UR: 6 [PH] (ref 5–8)
PROT UR-MCNC: NEGATIVE MG/DL
RBC #/AREA URNS AUTO: 3 /HPF
SP GR UR STRIP: 1.02 (ref 1–1.03)
UROBILINOGEN UR STRIP-ACNC: <2
VIT C UR-MCNC: NEGATIVE MG/DL
WBC #/AREA URNS AUTO: 1 /HPF

## 2017-02-22 PROCEDURE — 81025 URINE PREGNANCY TEST: CPT | Performed by: OTHER

## 2017-02-22 PROCEDURE — 81001 URINALYSIS AUTO W/SCOPE: CPT | Performed by: OTHER

## 2017-02-23 LAB
ALBUMIN SERPL BCP-MCNC: 3.9 G/DL (ref 3.5–4.8)
ALBUMIN/GLOB SERPL: 1.6 {RATIO} (ref 1–2)
ALP SERPL-CCNC: 44 U/L (ref 32–100)
ALT SERPL-CCNC: 34 U/L (ref 14–54)
ANION GAP SERPL CALC-SCNC: 8 MMOL/L (ref 0–18)
AST SERPL-CCNC: 30 U/L (ref 15–41)
BASOPHILS # BLD: 0 K/UL (ref 0–0.2)
BASOPHILS NFR BLD: 1 %
BILIRUB SERPL-MCNC: 0.7 MG/DL (ref 0.3–1.2)
BUN SERPL-MCNC: 10 MG/DL (ref 8–20)
BUN/CREAT SERPL: 10.4 (ref 10–20)
CALCIUM SERPL-MCNC: 9.1 MG/DL (ref 8.5–10.5)
CHLORIDE SERPL-SCNC: 107 MMOL/L (ref 95–110)
CHOLEST SERPL-MCNC: 199 MG/DL (ref 110–200)
CO2 SERPL-SCNC: 27 MMOL/L (ref 22–32)
CREAT SERPL-MCNC: 0.96 MG/DL (ref 0.5–1.5)
EOSINOPHIL # BLD: 0.1 K/UL (ref 0–0.7)
EOSINOPHIL NFR BLD: 3 %
ERYTHROCYTE [DISTWIDTH] IN BLOOD BY AUTOMATED COUNT: 13.1 % (ref 11–15)
GLOBULIN PLAS-MCNC: 2.5 G/DL (ref 2.5–3.7)
GLUCOSE SERPL-MCNC: 90 MG/DL (ref 70–99)
HCT VFR BLD AUTO: 39.6 % (ref 35–48)
HDLC SERPL-MCNC: 72 MG/DL
HGB BLD-MCNC: 13.2 G/DL (ref 12–16)
LDLC SERPL CALC-MCNC: 120 MG/DL (ref 0–99)
LYMPHOCYTES # BLD: 2 K/UL (ref 1–4)
LYMPHOCYTES NFR BLD: 47 %
MCH RBC QN AUTO: 30.9 PG (ref 27–32)
MCHC RBC AUTO-ENTMCNC: 33.3 G/DL (ref 32–37)
MCV RBC AUTO: 93 FL (ref 80–100)
MONOCYTES # BLD: 0.3 K/UL (ref 0–1)
MONOCYTES NFR BLD: 8 %
NEUTROPHILS # BLD AUTO: 1.8 K/UL (ref 1.8–7.7)
NEUTROPHILS NFR BLD: 42 %
NONHDLC SERPL-MCNC: 127 MG/DL
OSMOLALITY UR CALC.SUM OF ELEC: 293 MOSM/KG (ref 275–295)
PLATELET # BLD AUTO: 183 K/UL (ref 140–400)
PMV BLD AUTO: 7.5 FL (ref 7.4–10.3)
POTASSIUM SERPL-SCNC: 4.3 MMOL/L (ref 3.3–5.1)
PROT SERPL-MCNC: 6.4 G/DL (ref 5.9–8.4)
RBC # BLD AUTO: 4.26 M/UL (ref 3.7–5.4)
SODIUM SERPL-SCNC: 142 MMOL/L (ref 136–144)
TRIGL SERPL-MCNC: 34 MG/DL (ref 1–149)
TSH SERPL-ACNC: 2.48 UIU/ML (ref 0.34–5.6)
WBC # BLD AUTO: 4.3 K/UL (ref 4–11)

## 2017-02-23 PROCEDURE — 80061 LIPID PANEL: CPT | Performed by: OTHER

## 2017-02-23 PROCEDURE — 80053 COMPREHEN METABOLIC PANEL: CPT | Performed by: OTHER

## 2017-02-23 PROCEDURE — 36415 COLL VENOUS BLD VENIPUNCTURE: CPT | Performed by: OTHER

## 2017-02-23 PROCEDURE — 84443 ASSAY THYROID STIM HORMONE: CPT | Performed by: OTHER

## 2017-02-23 PROCEDURE — 85025 COMPLETE CBC W/AUTO DIFF WBC: CPT | Performed by: OTHER

## 2017-03-22 ENCOUNTER — LAB REQUISITION (OUTPATIENT)
Dept: ADMINISTRATIVE | Age: 27
End: 2017-03-22
Payer: COMMERCIAL

## 2017-03-22 DIAGNOSIS — F39 MOOD DISORDER (HCC): ICD-10-CM

## 2017-03-22 LAB — LITHIUM SERPL-SCNC: 0.4 MEQ/L (ref 0.5–1.5)

## 2017-03-22 PROCEDURE — 80178 ASSAY OF LITHIUM: CPT | Performed by: OTHER

## 2017-03-22 PROCEDURE — 36415 COLL VENOUS BLD VENIPUNCTURE: CPT | Performed by: OTHER

## 2017-04-17 ENCOUNTER — LAB REQUISITION (OUTPATIENT)
Dept: ADMINISTRATIVE | Age: 27
End: 2017-04-17
Payer: COMMERCIAL

## 2017-04-17 DIAGNOSIS — F33.2 SEVERE RECURRENT MAJOR DEPRESSION WITHOUT PSYCHOTIC FEATURES (HCC): ICD-10-CM

## 2017-04-20 PROCEDURE — 82565 ASSAY OF CREATININE: CPT | Performed by: OTHER

## 2017-04-20 PROCEDURE — 84520 ASSAY OF UREA NITROGEN: CPT | Performed by: OTHER

## 2017-09-16 ENCOUNTER — HOSPITAL ENCOUNTER (INPATIENT)
Dept: HOSPITAL 45 - C.EDB | Age: 27
LOS: 12 days | Discharge: LEFT BEFORE DISCHARGE | DRG: 885 | End: 2017-09-28
Attending: PSYCHIATRY & NEUROLOGY | Admitting: PSYCHIATRY & NEUROLOGY
Payer: COMMERCIAL

## 2017-09-16 VITALS
HEIGHT: 60.98 IN | BODY MASS INDEX: 24.18 KG/M2 | WEIGHT: 128.09 LBS | BODY MASS INDEX: 24.18 KG/M2 | WEIGHT: 128.09 LBS | HEIGHT: 60.98 IN

## 2017-09-16 VITALS — DIASTOLIC BLOOD PRESSURE: 66 MMHG | HEART RATE: 70 BPM | TEMPERATURE: 98.24 F | SYSTOLIC BLOOD PRESSURE: 113 MMHG

## 2017-09-16 VITALS — DIASTOLIC BLOOD PRESSURE: 65 MMHG | HEART RATE: 54 BPM | SYSTOLIC BLOOD PRESSURE: 97 MMHG | TEMPERATURE: 98.42 F

## 2017-09-16 VITALS — OXYGEN SATURATION: 97 %

## 2017-09-16 DIAGNOSIS — Z79.899: ICD-10-CM

## 2017-09-16 DIAGNOSIS — F12.90: ICD-10-CM

## 2017-09-16 DIAGNOSIS — T24.011A: ICD-10-CM

## 2017-09-16 DIAGNOSIS — F60.3: ICD-10-CM

## 2017-09-16 DIAGNOSIS — F17.200: ICD-10-CM

## 2017-09-16 DIAGNOSIS — F10.10: ICD-10-CM

## 2017-09-16 DIAGNOSIS — F43.12: ICD-10-CM

## 2017-09-16 DIAGNOSIS — Z91.5: ICD-10-CM

## 2017-09-16 DIAGNOSIS — Z81.8: ICD-10-CM

## 2017-09-16 DIAGNOSIS — R45.851: ICD-10-CM
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
## 2017-09-16 DIAGNOSIS — F33.9: Primary | ICD-10-CM

## 2017-09-16 DIAGNOSIS — E03.9: ICD-10-CM

## 2017-09-16 LAB
ALP SERPL-CCNC: 49 U/L (ref 45–117)
ALT SERPL-CCNC: 14 U/L (ref 12–78)
ANION GAP SERPL CALC-SCNC: 6 MMOL/L (ref 3–11)
APPEARANCE UR: CLEAR
AST SERPL-CCNC: 14 U/L (ref 15–37)
BASOPHILS # BLD: 0.02 K/UL (ref 0–0.2)
BASOPHILS NFR BLD: 0.4 %
BENZODIAZ UR-MCNC: (no result) UG/L
BILIRUB UR-MCNC: (no result) MG/DL
BUN SERPL-MCNC: 10 MG/DL (ref 7–18)
BUN/CREAT SERPL: 11.6 (ref 10–20)
CALCIUM SERPL-MCNC: 9.3 MG/DL (ref 8.5–10.1)
CHLORIDE SERPL-SCNC: 107 MMOL/L (ref 98–107)
CO2 SERPL-SCNC: 27 MMOL/L (ref 21–32)
COLOR UR: YELLOW
COMPLETE: YES
CREAT CL PREDICTED SERPL C-G-VRATE: 84.2 ML/MIN
CREAT SERPL-MCNC: 0.83 MG/DL (ref 0.6–1.2)
EOSINOPHIL NFR BLD AUTO: 218 K/UL (ref 130–400)
GLUCOSE SERPL-MCNC: 114 MG/DL (ref 70–99)
HCT VFR BLD CALC: 41.2 % (ref 37–47)
IG%: 0 %
IMM GRANULOCYTES NFR BLD AUTO: 37.5 %
LYMPHOCYTES # BLD: 1.68 K/UL (ref 1.2–3.4)
MANUAL MICROSCOPIC REQUIRED?: NO
MCH RBC QN AUTO: 31.5 PG (ref 25–34)
MCHC RBC AUTO-ENTMCNC: 33.7 G/DL (ref 32–36)
MCV RBC AUTO: 93.4 FL (ref 80–100)
MONOCYTES NFR BLD: 5.1 %
NEUTROPHILS # BLD AUTO: 2.2 %
NEUTROPHILS NFR BLD AUTO: 54.8 %
NITRITE UR QL STRIP: (no result)
PCP UR-MCNC: (no result) UG/L
PH UR STRIP: 6 [PH] (ref 4.5–7.5)
PMV BLD AUTO: 9.8 FL (ref 7.4–10.4)
POTASSIUM SERPL-SCNC: 3.8 MMOL/L (ref 3.5–5.1)
RBC # BLD AUTO: 4.41 M/UL (ref 4.2–5.4)
REVIEW REQ?: NO
SODIUM SERPL-SCNC: 140 MMOL/L (ref 136–145)
SP GR UR STRIP: 1.01 (ref 1–1.03)
TSH SERPL-ACNC: 0.7 UIU/ML (ref 0.3–4.5)
URINE BILL WITH OR WITHOUT MIC: (no result)
UROBILINOGEN UR-MCNC: (no result) MG/DL
WBC # BLD AUTO: 4.48 K/UL (ref 4.8–10.8)

## 2017-09-16 RX ADMIN — QUETIAPINE FUMARATE SCH MG: 25 TABLET, FILM COATED ORAL at 21:39

## 2017-09-16 RX ADMIN — NICOTINE SCH PATCH: 7 PATCH, EXTENDED RELEASE TRANSDERMAL at 19:47

## 2017-09-16 RX ADMIN — NALTREXONE HYDROCHLORIDE SCH MG: 50 TABLET, FILM COATED ORAL at 21:19

## 2017-09-16 RX ADMIN — BUSPIRONE HYDROCHLORIDE SCH MG: 15 TABLET ORAL at 21:52

## 2017-09-16 RX ADMIN — CLONIDINE HYDROCHLORIDE SCH MG: 0.1 TABLET ORAL at 21:12
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
## 2017-09-17 VITALS — DIASTOLIC BLOOD PRESSURE: 50 MMHG | TEMPERATURE: 98.42 F | HEART RATE: 92 BPM | SYSTOLIC BLOOD PRESSURE: 85 MMHG

## 2017-09-17 VITALS — DIASTOLIC BLOOD PRESSURE: 77 MMHG | TEMPERATURE: 98.06 F | SYSTOLIC BLOOD PRESSURE: 112 MMHG | HEART RATE: 90 BPM

## 2017-09-17 VITALS — SYSTOLIC BLOOD PRESSURE: 118 MMHG | DIASTOLIC BLOOD PRESSURE: 75 MMHG | TEMPERATURE: 98.06 F | HEART RATE: 79 BPM

## 2017-09-17 VITALS — HEART RATE: 74 BPM | DIASTOLIC BLOOD PRESSURE: 72 MMHG | TEMPERATURE: 98.42 F | SYSTOLIC BLOOD PRESSURE: 106 MMHG

## 2017-09-17 VITALS — SYSTOLIC BLOOD PRESSURE: 101 MMHG | DIASTOLIC BLOOD PRESSURE: 66 MMHG | HEART RATE: 68 BPM

## 2017-09-17 RX ADMIN — CLONIDINE HYDROCHLORIDE SCH MG: 0.1 TABLET ORAL at 21:25

## 2017-09-17 RX ADMIN — BUSPIRONE HYDROCHLORIDE SCH MG: 15 TABLET ORAL at 21:25

## 2017-09-17 RX ADMIN — ACETAMINOPHEN PRN MG: 325 TABLET ORAL at 12:36

## 2017-09-17 RX ADMIN — NALTREXONE HYDROCHLORIDE SCH MG: 50 TABLET, FILM COATED ORAL at 07:35

## 2017-09-17 RX ADMIN — BUSPIRONE HYDROCHLORIDE SCH MG: 15 TABLET ORAL at 13:53

## 2017-09-17 RX ADMIN — QUETIAPINE FUMARATE SCH MG: 25 TABLET, FILM COATED ORAL at 21:25

## 2017-09-17 RX ADMIN — LORAZEPAM PRN MG: 0.5 TABLET ORAL at 19:02

## 2017-09-17 RX ADMIN — NICOTINE SCH PATCH: 21 PATCH, EXTENDED RELEASE TRANSDERMAL at 13:57

## 2017-09-17 RX ADMIN — LEVOTHYROXINE SODIUM SCH MCG: 75 TABLET ORAL at 07:33

## 2017-09-17 RX ADMIN — CITALOPRAM HYDROBROMIDE SCH MG: 40 TABLET ORAL at 07:34

## 2017-09-17 RX ADMIN — LORAZEPAM PRN MG: 0.5 TABLET ORAL at 10:23

## 2017-09-17 RX ADMIN — NICOTINE SCH PATCH: 7 PATCH, EXTENDED RELEASE TRANSDERMAL at 07:37

## 2017-09-17 RX ADMIN — BUSPIRONE HYDROCHLORIDE SCH MG: 15 TABLET ORAL at 07:33

## 2017-09-17 RX ADMIN — LURASIDONE HYDROCHLORIDE SCH MG: 40 TABLET, FILM COATED ORAL at 17:20
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
## 2017-09-18 VITALS — DIASTOLIC BLOOD PRESSURE: 58 MMHG | TEMPERATURE: 98.6 F | SYSTOLIC BLOOD PRESSURE: 90 MMHG | HEART RATE: 80 BPM

## 2017-09-18 VITALS — SYSTOLIC BLOOD PRESSURE: 87 MMHG | TEMPERATURE: 98.6 F | HEART RATE: 62 BPM | DIASTOLIC BLOOD PRESSURE: 62 MMHG

## 2017-09-18 RX ADMIN — BUSPIRONE HYDROCHLORIDE SCH MG: 15 TABLET ORAL at 21:25

## 2017-09-18 RX ADMIN — BUSPIRONE HYDROCHLORIDE SCH MG: 15 TABLET ORAL at 14:06

## 2017-09-18 RX ADMIN — LACTASE TAB 3000 UNIT PRN UNITS: 3000 TAB at 12:46

## 2017-09-18 RX ADMIN — LEVOTHYROXINE SODIUM SCH MCG: 75 TABLET ORAL at 08:10

## 2017-09-18 RX ADMIN — BUSPIRONE HYDROCHLORIDE SCH MG: 15 TABLET ORAL at 08:51

## 2017-09-18 RX ADMIN — LACTASE TAB 3000 UNIT PRN UNITS: 3000 TAB at 17:22

## 2017-09-18 RX ADMIN — NICOTINE SCH PATCH: 21 PATCH, EXTENDED RELEASE TRANSDERMAL at 08:51

## 2017-09-18 RX ADMIN — NALTREXONE HYDROCHLORIDE SCH MG: 50 TABLET, FILM COATED ORAL at 08:51

## 2017-09-18 RX ADMIN — LURASIDONE HYDROCHLORIDE SCH MG: 40 TABLET, FILM COATED ORAL at 17:33

## 2017-09-18 RX ADMIN — QUETIAPINE FUMARATE SCH MG: 25 TABLET, FILM COATED ORAL at 21:24

## 2017-09-18 RX ADMIN — CLONIDINE HYDROCHLORIDE SCH MG: 0.1 TABLET ORAL at 21:24

## 2017-09-18 RX ADMIN — LORAZEPAM PRN MG: 0.5 TABLET ORAL at 14:06

## 2017-09-18 RX ADMIN — CITALOPRAM HYDROBROMIDE SCH MG: 40 TABLET ORAL at 08:51
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
## 2017-09-19 VITALS — DIASTOLIC BLOOD PRESSURE: 55 MMHG | TEMPERATURE: 98.06 F | HEART RATE: 80 BPM | SYSTOLIC BLOOD PRESSURE: 96 MMHG

## 2017-09-19 VITALS — SYSTOLIC BLOOD PRESSURE: 114 MMHG | DIASTOLIC BLOOD PRESSURE: 77 MMHG | HEART RATE: 66 BPM

## 2017-09-19 RX ADMIN — CITALOPRAM HYDROBROMIDE SCH MG: 40 TABLET ORAL at 08:36

## 2017-09-19 RX ADMIN — LACTASE TAB 3000 UNIT PRN UNITS: 3000 TAB at 17:20

## 2017-09-19 RX ADMIN — QUETIAPINE FUMARATE SCH MG: 25 TABLET, FILM COATED ORAL at 21:01

## 2017-09-19 RX ADMIN — LORAZEPAM PRN MG: 0.5 TABLET ORAL at 15:01

## 2017-09-19 RX ADMIN — BUSPIRONE HYDROCHLORIDE SCH MG: 15 TABLET ORAL at 13:37

## 2017-09-19 RX ADMIN — BUSPIRONE HYDROCHLORIDE SCH MG: 15 TABLET ORAL at 21:01

## 2017-09-19 RX ADMIN — BUSPIRONE HYDROCHLORIDE SCH MG: 15 TABLET ORAL at 08:35

## 2017-09-19 RX ADMIN — LACTASE TAB 3000 UNIT PRN UNITS: 3000 TAB at 20:51

## 2017-09-19 RX ADMIN — CLONIDINE HYDROCHLORIDE SCH MG: 0.1 TABLET ORAL at 21:01

## 2017-09-19 RX ADMIN — NICOTINE SCH PATCH: 21 PATCH, EXTENDED RELEASE TRANSDERMAL at 08:40

## 2017-09-19 RX ADMIN — LACTASE TAB 3000 UNIT PRN UNITS: 3000 TAB at 12:45

## 2017-09-19 RX ADMIN — LEVOTHYROXINE SODIUM SCH MCG: 75 TABLET ORAL at 07:49

## 2017-09-19 RX ADMIN — NALTREXONE HYDROCHLORIDE SCH MG: 50 TABLET, FILM COATED ORAL at 08:36

## 2017-09-19 RX ADMIN — LURASIDONE HYDROCHLORIDE SCH MG: 40 TABLET, FILM COATED ORAL at 17:25
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
## 2017-09-20 VITALS — HEART RATE: 52 BPM | DIASTOLIC BLOOD PRESSURE: 55 MMHG | TEMPERATURE: 98.24 F | SYSTOLIC BLOOD PRESSURE: 91 MMHG

## 2017-09-20 VITALS — HEART RATE: 64 BPM | DIASTOLIC BLOOD PRESSURE: 60 MMHG | SYSTOLIC BLOOD PRESSURE: 102 MMHG

## 2017-09-20 RX ADMIN — LEVOTHYROXINE SODIUM SCH MCG: 75 TABLET ORAL at 07:39

## 2017-09-20 RX ADMIN — NALTREXONE HYDROCHLORIDE SCH MG: 50 TABLET, FILM COATED ORAL at 07:39

## 2017-09-20 RX ADMIN — NICOTINE SCH PATCH: 21 PATCH, EXTENDED RELEASE TRANSDERMAL at 07:40

## 2017-09-20 RX ADMIN — LURASIDONE HYDROCHLORIDE SCH MG: 40 TABLET, FILM COATED ORAL at 17:27

## 2017-09-20 RX ADMIN — CITALOPRAM HYDROBROMIDE SCH MG: 40 TABLET ORAL at 07:39

## 2017-09-20 RX ADMIN — LORAZEPAM PRN MG: 0.5 TABLET ORAL at 12:28

## 2017-09-20 RX ADMIN — BUSPIRONE HYDROCHLORIDE SCH MG: 15 TABLET ORAL at 13:48

## 2017-09-20 RX ADMIN — QUETIAPINE FUMARATE SCH MG: 25 TABLET, FILM COATED ORAL at 21:18

## 2017-09-20 RX ADMIN — CLONIDINE HYDROCHLORIDE SCH MG: 0.1 TABLET ORAL at 21:18

## 2017-09-20 RX ADMIN — BUSPIRONE HYDROCHLORIDE SCH MG: 15 TABLET ORAL at 21:18

## 2017-09-20 RX ADMIN — BUSPIRONE HYDROCHLORIDE SCH MG: 15 TABLET ORAL at 07:39
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
## 2017-09-21 VITALS — TEMPERATURE: 98.06 F | HEART RATE: 64 BPM | DIASTOLIC BLOOD PRESSURE: 54 MMHG | SYSTOLIC BLOOD PRESSURE: 91 MMHG

## 2017-09-21 VITALS — HEART RATE: 75 BPM | SYSTOLIC BLOOD PRESSURE: 109 MMHG | DIASTOLIC BLOOD PRESSURE: 67 MMHG

## 2017-09-21 RX ADMIN — LEVOTHYROXINE SODIUM SCH MCG: 75 TABLET ORAL at 08:09

## 2017-09-21 RX ADMIN — BUSPIRONE HYDROCHLORIDE SCH MG: 15 TABLET ORAL at 13:29

## 2017-09-21 RX ADMIN — BUSPIRONE HYDROCHLORIDE SCH MG: 15 TABLET ORAL at 22:02

## 2017-09-21 RX ADMIN — QUETIAPINE FUMARATE SCH MG: 25 TABLET, FILM COATED ORAL at 22:02

## 2017-09-21 RX ADMIN — LORAZEPAM PRN MG: 0.5 TABLET ORAL at 13:09

## 2017-09-21 RX ADMIN — CLONIDINE HYDROCHLORIDE SCH MG: 0.1 TABLET ORAL at 22:02

## 2017-09-21 RX ADMIN — BUSPIRONE HYDROCHLORIDE SCH MG: 15 TABLET ORAL at 08:09

## 2017-09-21 RX ADMIN — NALTREXONE HYDROCHLORIDE SCH MG: 50 TABLET, FILM COATED ORAL at 08:09

## 2017-09-21 RX ADMIN — LACTASE TAB 3000 UNIT PRN UNITS: 3000 TAB at 12:41

## 2017-09-21 RX ADMIN — LURASIDONE HYDROCHLORIDE SCH MG: 40 TABLET, FILM COATED ORAL at 17:40

## 2017-09-21 RX ADMIN — LORAZEPAM PRN MG: 0.5 TABLET ORAL at 20:29

## 2017-09-21 RX ADMIN — NICOTINE SCH PATCH: 21 PATCH, EXTENDED RELEASE TRANSDERMAL at 08:10

## 2017-09-21 RX ADMIN — CITALOPRAM HYDROBROMIDE SCH MG: 40 TABLET ORAL at 08:09
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
## 2017-09-22 VITALS — DIASTOLIC BLOOD PRESSURE: 65 MMHG | SYSTOLIC BLOOD PRESSURE: 97 MMHG | HEART RATE: 60 BPM

## 2017-09-22 VITALS — DIASTOLIC BLOOD PRESSURE: 53 MMHG | SYSTOLIC BLOOD PRESSURE: 86 MMHG | TEMPERATURE: 98.42 F | HEART RATE: 67 BPM

## 2017-09-22 RX ADMIN — CLONIDINE HYDROCHLORIDE SCH MG: 0.1 TABLET ORAL at 21:22

## 2017-09-22 RX ADMIN — LEVOTHYROXINE SODIUM SCH MCG: 75 TABLET ORAL at 08:06

## 2017-09-22 RX ADMIN — NICOTINE SCH PATCH: 21 PATCH, EXTENDED RELEASE TRANSDERMAL at 08:40

## 2017-09-22 RX ADMIN — BUSPIRONE HYDROCHLORIDE SCH MG: 15 TABLET ORAL at 21:24

## 2017-09-22 RX ADMIN — LORAZEPAM PRN MG: 0.5 TABLET ORAL at 19:59

## 2017-09-22 RX ADMIN — CITALOPRAM HYDROBROMIDE SCH MG: 40 TABLET ORAL at 08:38

## 2017-09-22 RX ADMIN — LURASIDONE HYDROCHLORIDE SCH MG: 40 TABLET, FILM COATED ORAL at 17:37

## 2017-09-22 RX ADMIN — NICOTINE POLACRILEX PRN PIECE: 2 GUM, CHEWING ORAL at 13:02

## 2017-09-22 RX ADMIN — NALTREXONE HYDROCHLORIDE SCH MG: 50 TABLET, FILM COATED ORAL at 08:38

## 2017-09-22 RX ADMIN — BUSPIRONE HYDROCHLORIDE SCH MG: 15 TABLET ORAL at 08:38

## 2017-09-22 RX ADMIN — QUETIAPINE FUMARATE SCH MG: 25 TABLET, FILM COATED ORAL at 21:24

## 2017-09-22 RX ADMIN — BUSPIRONE HYDROCHLORIDE SCH MG: 15 TABLET ORAL at 13:51
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
## 2017-09-23 VITALS — SYSTOLIC BLOOD PRESSURE: 100 MMHG | DIASTOLIC BLOOD PRESSURE: 71 MMHG | HEART RATE: 66 BPM | TEMPERATURE: 98.06 F

## 2017-09-23 VITALS — HEART RATE: 90 BPM | SYSTOLIC BLOOD PRESSURE: 119 MMHG | DIASTOLIC BLOOD PRESSURE: 73 MMHG

## 2017-09-23 RX ADMIN — NICOTINE POLACRILEX PRN PIECE: 2 GUM, CHEWING ORAL at 16:06

## 2017-09-23 RX ADMIN — BUSPIRONE HYDROCHLORIDE SCH MG: 15 TABLET ORAL at 13:51

## 2017-09-23 RX ADMIN — NICOTINE SCH PATCH: 21 PATCH, EXTENDED RELEASE TRANSDERMAL at 08:44

## 2017-09-23 RX ADMIN — NALTREXONE HYDROCHLORIDE SCH MG: 50 TABLET, FILM COATED ORAL at 08:42

## 2017-09-23 RX ADMIN — LACTASE TAB 3000 UNIT PRN UNITS: 3000 TAB at 17:18

## 2017-09-23 RX ADMIN — CITALOPRAM HYDROBROMIDE SCH MG: 40 TABLET ORAL at 08:42

## 2017-09-23 RX ADMIN — BUSPIRONE HYDROCHLORIDE SCH MG: 15 TABLET ORAL at 08:41

## 2017-09-23 RX ADMIN — NICOTINE POLACRILEX PRN PIECE: 2 GUM, CHEWING ORAL at 19:25

## 2017-09-23 RX ADMIN — LEVOTHYROXINE SODIUM SCH MCG: 75 TABLET ORAL at 07:58

## 2017-09-23 RX ADMIN — CLONIDINE HYDROCHLORIDE SCH MG: 0.1 TABLET ORAL at 23:32

## 2017-09-23 RX ADMIN — LACTASE TAB 3000 UNIT PRN UNITS: 3000 TAB at 18:30

## 2017-09-23 RX ADMIN — LACTASE TAB 3000 UNIT PRN UNITS: 3000 TAB at 12:51

## 2017-09-23 RX ADMIN — BUSPIRONE HYDROCHLORIDE SCH MG: 15 TABLET ORAL at 23:32

## 2017-09-23 RX ADMIN — QUETIAPINE FUMARATE SCH MG: 25 TABLET, FILM COATED ORAL at 23:32
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
## 2017-09-24 VITALS — SYSTOLIC BLOOD PRESSURE: 105 MMHG | HEART RATE: 80 BPM | DIASTOLIC BLOOD PRESSURE: 70 MMHG

## 2017-09-24 VITALS — SYSTOLIC BLOOD PRESSURE: 93 MMHG | DIASTOLIC BLOOD PRESSURE: 57 MMHG | TEMPERATURE: 97.34 F | HEART RATE: 58 BPM

## 2017-09-24 RX ADMIN — QUETIAPINE FUMARATE SCH MG: 25 TABLET, FILM COATED ORAL at 23:09

## 2017-09-24 RX ADMIN — CLONIDINE HYDROCHLORIDE SCH MG: 0.1 TABLET ORAL at 23:09

## 2017-09-24 RX ADMIN — NALTREXONE HYDROCHLORIDE SCH MG: 50 TABLET, FILM COATED ORAL at 08:36

## 2017-09-24 RX ADMIN — LACTASE TAB 3000 UNIT PRN UNITS: 3000 TAB at 12:49

## 2017-09-24 RX ADMIN — LACTASE TAB 3000 UNIT PRN UNITS: 3000 TAB at 20:48

## 2017-09-24 RX ADMIN — BUSPIRONE HYDROCHLORIDE SCH MG: 15 TABLET ORAL at 23:09

## 2017-09-24 RX ADMIN — LURASIDONE HYDROCHLORIDE SCH MG: 40 TABLET, FILM COATED ORAL at 23:09

## 2017-09-24 RX ADMIN — CITALOPRAM HYDROBROMIDE SCH MG: 40 TABLET ORAL at 08:36

## 2017-09-24 RX ADMIN — NICOTINE SCH PATCH: 21 PATCH, EXTENDED RELEASE TRANSDERMAL at 08:37

## 2017-09-24 RX ADMIN — NICOTINE POLACRILEX PRN PIECE: 2 GUM, CHEWING ORAL at 15:52

## 2017-09-24 RX ADMIN — NICOTINE POLACRILEX PRN PIECE: 2 GUM, CHEWING ORAL at 19:52

## 2017-09-24 RX ADMIN — LEVOTHYROXINE SODIUM SCH MCG: 75 TABLET ORAL at 08:35

## 2017-09-24 RX ADMIN — BUSPIRONE HYDROCHLORIDE SCH MG: 15 TABLET ORAL at 08:35

## 2017-09-24 RX ADMIN — BUSPIRONE HYDROCHLORIDE SCH MG: 15 TABLET ORAL at 14:31

## 2017-09-24 RX ADMIN — NICOTINE POLACRILEX PRN PIECE: 2 GUM, CHEWING ORAL at 13:09
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
## 2017-09-25 VITALS — DIASTOLIC BLOOD PRESSURE: 64 MMHG | SYSTOLIC BLOOD PRESSURE: 87 MMHG | HEART RATE: 87 BPM

## 2017-09-25 VITALS — HEART RATE: 84 BPM | DIASTOLIC BLOOD PRESSURE: 63 MMHG | SYSTOLIC BLOOD PRESSURE: 104 MMHG

## 2017-09-25 VITALS — HEART RATE: 76 BPM | SYSTOLIC BLOOD PRESSURE: 128 MMHG | DIASTOLIC BLOOD PRESSURE: 76 MMHG

## 2017-09-25 VITALS — SYSTOLIC BLOOD PRESSURE: 87 MMHG | HEART RATE: 52 BPM | TEMPERATURE: 97.7 F | DIASTOLIC BLOOD PRESSURE: 59 MMHG

## 2017-09-25 RX ADMIN — NALTREXONE HYDROCHLORIDE SCH MG: 50 TABLET, FILM COATED ORAL at 08:16

## 2017-09-25 RX ADMIN — NICOTINE POLACRILEX PRN PIECE: 2 GUM, CHEWING ORAL at 20:16

## 2017-09-25 RX ADMIN — CITALOPRAM HYDROBROMIDE SCH MG: 40 TABLET ORAL at 08:16

## 2017-09-25 RX ADMIN — BUSPIRONE HYDROCHLORIDE SCH MG: 15 TABLET ORAL at 14:01

## 2017-09-25 RX ADMIN — BUSPIRONE HYDROCHLORIDE SCH MG: 15 TABLET ORAL at 22:59

## 2017-09-25 RX ADMIN — LEVOTHYROXINE SODIUM SCH MCG: 75 TABLET ORAL at 08:16

## 2017-09-25 RX ADMIN — QUETIAPINE FUMARATE SCH MG: 25 TABLET, FILM COATED ORAL at 23:00

## 2017-09-25 RX ADMIN — BUSPIRONE HYDROCHLORIDE SCH MG: 15 TABLET ORAL at 08:16

## 2017-09-25 RX ADMIN — CLONIDINE HYDROCHLORIDE SCH MG: 0.1 TABLET ORAL at 23:00

## 2017-09-25 RX ADMIN — LURASIDONE HYDROCHLORIDE SCH MG: 40 TABLET, FILM COATED ORAL at 22:00

## 2017-09-25 RX ADMIN — NICOTINE POLACRILEX PRN PIECE: 2 GUM, CHEWING ORAL at 14:01

## 2017-09-25 RX ADMIN — NICOTINE SCH PATCH: 21 PATCH, EXTENDED RELEASE TRANSDERMAL at 08:18

## 2017-09-25 RX ADMIN — NICOTINE POLACRILEX PRN PIECE: 2 GUM, CHEWING ORAL at 10:35

## 2017-09-25 NOTE — PSYCHIATRIC PROGRESS NOTES
Progress Note


Date of Service


Sep 25, 2017.





Interval History


The patient is a 26-year-old single white female with a long-standing history 

of major depressive disorder recurrent, PTSD, borderline personality structure 

with chronic suicidality and self-injurious behavior with acute intense 

suicidal ideations with plan and intention.  She did reach out to her 

outpatient provider prior to acting on her plan and has submitted to voluntary 

inpatient hospitalization.  In discussions with her she feels so depressed she 

continues to want to die but feels that she does not have the means while on 

this unit.  She shows modest future orientation and asking her staff to again 

look into [facility] so that she can resume her dialectical behavioral 

therapy and trauma therapy.





She has a chronic high risk for suicide, she is a high acute risk for suicide.





Chief Complaint


"Saturday was a good day".





Subjective


Patient was seen & assessed interval progress reviewed with Treatment Team. 

Staff report she had a difficult day yesterday, after talking to her mother 

whom she said didn't understand why she would need/want to go back to 

[facility] for more treatment. She talked to her brother, who agreed to 

be guarantor for treatment there, and remains willing to return to [facility] for ongoing treatment.  She also confirmed that her mother can take care 

of her dog while she is there.  She has refused Latuda for the past 2 nights.  

Today, she states that she had a good day on Saturday, but yesterday her mood 

was lower, a 3 or 4 out of 10.  She says she is not sure why her mood was lower

, and does not mention the incident with her mother.  She endorses passive 

suicidal thoughts yesterday, and urges to injure herself by burning, but denies 

that she acted on them or tried to harm herself in any way.  She remains 

hopeful that she will be able to go to [facility] this week.  She 

reports good sleep and appetite, and denies SI.





Sleep Information


Total Hours of Sleep:  6.00





Meal Information


Percent of Breakfast Consumed:  80


Percent of Lunch Consumed:  100


Percent of Dinner Consumed:  25





Mental Status Exam


During interview pt is:  alert and oriented, cooperative


Appearance:  appropriately dressed, appropriately groomed


Eye contact is:  fair


Motor behavior is:  steady gait & station, no abnormal motor movements


Speech:  other (limited spontaneaous speech, answers questions asked in brief 

ways)


Affect:  depressed, constricted


Mood is:  other ("okay")


Thought process:  goal directed


Thought content:  guilt


Suicidal thought are:  denied


Homicidal thoughts are:  denied


Hallucinations:  denies auditory, denies visual


Cognition:  memory grossly intact, attention grossly intact, language grossly 

intact


Intelligence estimated to be:  average


Insight:  impaired


Judgement:  poor





Impression


Thoughts of SI and self abuse continue, but without acts.  She has been 

referred back to [facility].  If accepted, she will need to go home to 

gather belongings and place her dog, and we are in support of her driving 

herself to [facility], as she has no other options given her family situation.





Plan





(1) Self-injurious behavior


Inpatient care is least restrictive and most appropriate setting for care at 

this time.  


Close observation and suicide checks on the unit


Monitor her first further self-injurious behaviors, naltrexone may provide some 

protection against urges.  In the future topiramate may be an option off label 

to help decrease self-injurious urges.





9/19


   - No self injurious acts





9/20


   - Patient denying suicidality here, and has not engaged in self injury, but 

no risk factors have been modified that would decrease her risk outside of the 

hospital.





9/22


   - Ongoing thoughts of SIB but no acts


9/25


   - Burn wound is healing, and patient denies further self injury.





(2) Major depressive disorder, recurrent episode with anxious distress


Patient has had multiple medication trials in the past. Further medication 

changes at this time are not likely to be fully helpful to restore her mood or 

reduce her PTSD/trauma symptoms.  However we did discuss moving naltrexone all 

to the morning to reduce its impact on sleep to include night time awakening 

and vivid dreams.  Continue Seroquel 25 mg at bedtime, citalopram 40mg daily, 

buspirone 15mg tid, clonidine 0.1mg qhs, lorazepam 0.5mg tid prn anxiety, 

lurasidone 40mg with dinner (she has not been taking it with food).





9/19


   - Recommend referral to [facility] ([facility]) for long term treatment of 

depression and PTSD.





9/20


   - Reviewed last fasting lipid profile and glucose, done Jan. 2017 and 

results were normal. Will need to be repeated in Jan. 2018.


9/21


   - Will interview with [facility] today for long term treatment


   - Continue current meds'





9/23    - given probable akathisia that could be coming from latuda now being 

taken with food, will lower latuda to 20mg with dinner after review of options.

   Propranolol reviewed but not rx'd  for now given recent BP readings.  has 

clonidine at night if blood pressure is appropriate and prn clonidine not being 

taken given bp readings.  considered cogentin prn but did not rx given past 

adverse effect, thus deferring Artane for now as well.  PT has ativan prn and 

benzo sometimes help akathisia and can take if akathisia is significantly 

bothersome with lowering of latuda main intervention  





9/24   - given refusal of latuda at dinner time despite dose lowering over 

concerns of akathisia that might occur from it, moving latuda to bedtime at 

prior dosage 40mg since tolerated it well then after review of impact of  

effective dose level when not taken with 350+ calories 


         - maintained other meds     


9/25


   - Continue current medications, and encourage patient to take Latuda, which 

she has refused the past 2 nights.





(3) PTSD (post-traumatic stress disorder)


History of trauma patient would benefit from return to DBT-based therapy for 

further work.  We'll continue clonidine to reduce hyperarousal, continue Celexa

,  in the future she may benefit from a trial of topiramate to target intrusive 

nightmares.





9/21


   - Interview with [facility] today


9/22


   - Continue current plan





9/23    -latuda adjusted as above for akathisia related s/e 





9/24     -latuda adjusted to 40mg  hs dosing   (not with food) as per above 





(4) Alcohol use disorder


Patient on AWSS scale, agree with naltrexone which may help reduce self-

injurious behaviors as well as permanent alcohol abstinence.  She reported 

benefit from AA and has a sponsor as well as meetings and home group.  We will 

encourage her return to these.  She found 12-step programming at [facility] helpful.


9/19


   - Still with cravings


   - AWSS DC'd as not triggering. 





9/25


The patient's AUDIT score suggests problematic drinking (Zone III WHO).  Brief 

intervention was offered and accepted 


Intervention was greater than 5 min in length.





Brief interventions include: 1. Assess Readiness to Quit, 2. Advise: Help 

Patient to Reduce or Abstain from Alcohol, 3. Agree: Set Specific, Feasible 

Goals, 4. Assist: Anticipate barriers, Problem-Solving Solutions.  Social work 

to 5. Arrange: Referrals to appropriate treatment.  





Summary of intervention: The patient is in precontemplation stage with regards 

to transtheoretical model of change.  The patient is advised to decrease 

alcohol consumption due to depressant effects and risk of interactions with 

prescription medications.  





(5) Cannabis use disorder, mild, in early remission


Encourage abstinence. Patient is in the precontemplation stage with respect to 

this.





(6) Nicotine dependence


Continue replacement therapy with the patch, patient is precontemplation with 

respect to this.





9/19


   - Order nicotine gum





(7) Burn


Have applied bandage nursing to check daily, will see if winter nurses able to 

come this week to evaluate it Thursday or Friday.





9/18


   - Consult wound nurse.  Patient is seen by the wound clinic as an OP.





9/25


   - Nursing changed bandage on burn wound. 





(8) Hypothyroid


Continue levothyroxine as prescribed








Discharge / Aftercare Planning


Primary Care Physician:  


   Name:  [facility]


   Phone Number:  [phone]


Psychiatrist:  


   Name:  Glenna Spann PA-C


   Phone Number:  [phone]


Therapist:  


   Name:  [facility]


   Phone Number:  [phone]


   Date of Appointment:  Sep 18, 2017


   Appointment Notes:  Standing appts every Monday and Thursday at 4:00pm


:  


   Name:  .





Visit Code


E&M Code:  51626





Inventory Assets


Strengths:


Reaching out to her outpatient provider prior to acting on suicidal thinking, 

good alliance with outpatient provider, and the locked unit, compliant with 

medications


Needs:


Safe place while still suicidal actively, structured programming that will help 

her with increase distress tolerance, and combating chronic suicidality and 

assisting in trauma recovery





Risk Factors Assessment


:  Yes


/single/:  Yes


Higher / Fall in social status:  No


Health problems:  No


Mental Health Diagnoses:  Yes


Substance use disorders:  Yes


Previous attempt:  Yes


Previous psychiatric stay:  Yes


Hopelessness:  Yes


Smoker:  Yes





Protective Factors Assessment


Hinduism beliefs:  No


:  No


Responsible for young children:  No


Employed:  No


Stable relationships:  No


Supportive family:  No


Good rapport with provider:  Yes





Data


Vital Signs Last 24 Hrs:











  Date Time  Temp Pulse Resp B/P (MAP) Pulse Ox O2 Delivery O2 Flow Rate FiO2


 


9/25/17 10:51  65  97/64    





  87  87/56    


 


9/25/17 06:50 36.5 52 18 87/50    





  64  88/59    


 


9/24/17 23:11  80 18 105/70

## 2017-09-26 VITALS — SYSTOLIC BLOOD PRESSURE: 104 MMHG | DIASTOLIC BLOOD PRESSURE: 53 MMHG | HEART RATE: 63 BPM

## 2017-09-26 VITALS — DIASTOLIC BLOOD PRESSURE: 54 MMHG | SYSTOLIC BLOOD PRESSURE: 94 MMHG | HEART RATE: 75 BPM | TEMPERATURE: 98.24 F

## 2017-09-26 VITALS — HEART RATE: 76 BPM | SYSTOLIC BLOOD PRESSURE: 136 MMHG | DIASTOLIC BLOOD PRESSURE: 72 MMHG

## 2017-09-26 VITALS — SYSTOLIC BLOOD PRESSURE: 111 MMHG | DIASTOLIC BLOOD PRESSURE: 73 MMHG | HEART RATE: 67 BPM

## 2017-09-26 RX ADMIN — LURASIDONE HYDROCHLORIDE SCH MG: 40 TABLET, FILM COATED ORAL at 22:00

## 2017-09-26 RX ADMIN — CITALOPRAM HYDROBROMIDE SCH MG: 40 TABLET ORAL at 08:43

## 2017-09-26 RX ADMIN — NICOTINE SCH PATCH: 21 PATCH, EXTENDED RELEASE TRANSDERMAL at 08:43

## 2017-09-26 RX ADMIN — NICOTINE POLACRILEX PRN PIECE: 2 GUM, CHEWING ORAL at 10:47

## 2017-09-26 RX ADMIN — NICOTINE POLACRILEX PRN PIECE: 2 GUM, CHEWING ORAL at 20:35

## 2017-09-26 RX ADMIN — LORAZEPAM PRN MG: 0.5 TABLET ORAL at 15:19

## 2017-09-26 RX ADMIN — NALTREXONE HYDROCHLORIDE SCH MG: 50 TABLET, FILM COATED ORAL at 08:43

## 2017-09-26 RX ADMIN — BUSPIRONE HYDROCHLORIDE SCH MG: 15 TABLET ORAL at 13:35

## 2017-09-26 RX ADMIN — NICOTINE POLACRILEX PRN PIECE: 2 GUM, CHEWING ORAL at 13:34

## 2017-09-26 RX ADMIN — BUSPIRONE HYDROCHLORIDE SCH MG: 15 TABLET ORAL at 08:43

## 2017-09-26 RX ADMIN — LEVOTHYROXINE SODIUM SCH MCG: 75 TABLET ORAL at 08:43

## 2017-09-26 RX ADMIN — BUSPIRONE HYDROCHLORIDE SCH MG: 15 TABLET ORAL at 22:18

## 2017-09-26 RX ADMIN — NICOTINE POLACRILEX PRN PIECE: 2 GUM, CHEWING ORAL at 17:02

## 2017-09-26 RX ADMIN — LORAZEPAM PRN MG: 0.5 TABLET ORAL at 22:29

## 2017-09-26 RX ADMIN — QUETIAPINE FUMARATE SCH MG: 25 TABLET, FILM COATED ORAL at 22:18

## 2017-09-26 RX ADMIN — CLONIDINE HYDROCHLORIDE SCH MG: 0.1 TABLET ORAL at 22:19

## 2017-09-26 NOTE — PSYCHIATRIC PROGRESS NOTES
Progress Note


Date of Service


Sep 26, 2017.





Interval History


The patient is a 26-year-old single white female with a long-standing history 

of major depressive disorder recurrent, PTSD, borderline personality structure 

with chronic suicidality and self-injurious behavior with acute intense 

suicidal ideations with plan and intention.  She did reach out to her 

outpatient provider prior to acting on her plan and has submitted to voluntary 

inpatient hospitalization.  In discussions with her she feels so depressed she 

continues to want to die but feels that she does not have the means while on 

this unit.  She shows modest future orientation and asking her staff to again 

look into [facility] so that she can resume her dialectical behavioral 

therapy and trauma therapy.





She has a chronic high risk for suicide, she is a high acute risk for suicide.





Chief Complaint


"I have a bone to pick with you. ".





Subjective


Patient was seen & assessed interval progress reviewed with Treatment Team.  

The patient is jokingly complaining that I did not order her 2 pieces of 

nicotine gum at a time, as she is looking for the same jolt she gets from 

smoking cigarettes, which she is looking forward to resuming tomorrow.  She has 

been accepted to  on Thursday and has  a phone meeting with her advisor this 

afternoon to discuss medical withdrawal from school.  She is anxious that this 

will not be well received given that she has had 2 absences already from her 

program.  She has also decided that if her advisor says that she will drop her 

from the program, then she will not go to [facility], but just return to school.  In 

asking how this will help her in the long run, she says "I don't know", but is 

not willing to risk losing her educational experience.  She says that she is 

not having SI, but continues to have thoughts to burn herself, but has made no 

self injurious acts.   She denies any physical complaints today or side effects 

to refusing her Latuda for the last 3 days, but staff report that she was 

complaining of dizziness and blurred vision last evening.  They also described 

her as being bright in affect, and joking.





Review of Systems


Constitutional:  No fever, No chills, No sweats, No weight loss, No weakness, 

No fatigue, No problem reported


ENT:  No hearing loss, No unusual epistaxis, No nasal symptoms, No sore throat, 

No tinnitus, No dental problems, No trouble swallowing, No problem reported


Respiratory:  No cough, No sputum, No wheezing, No shortness of breath, No 

dyspnea on exertion, No dyspnea at rest, No hemoptysis, No problem reported


Cardiovascular:  No chest pain, No orthopnea, No PND, No edema, No claudication

, No palpitations, No problem reported


Abdomen:  No pain, No nausea, No vomiting, No diarrhea, No constipation, No GI 

bleeding, No problem reported


Musculoskeletal:  No joint pain, No muscle pain, No swelling, No calf pain, No 

problem reported


Neurologic:  No memory loss, No paralysis, No weakness, No numbness/tingling, 

No vertigo, No balance problems, No problem reported


Psychiatric:  + anxiety, + problem reported (thoughts to self injure)


Integumentary:  + problem reported (healing burns rt thigh)





Sleep Information


Total Hours of Sleep:  6.00





Meal Information


Percent of Breakfast Consumed:  80


Percent of Lunch Consumed:  80


Percent of Dinner Consumed:  100





Mental Status Exam


During interview pt is:  alert and oriented, cooperative


Appearance:  appropriately dressed, appropriately groomed


Eye contact is:  good


Motor behavior is:  steady gait & station, no abnormal motor movements


Speech:  normal in rate, rhythm & volume


Affect:  euthymic


Mood is:  anxious


Thought process:  goal directed


Thought content:  guilt


Suicidal thought are:  denied


Homicidal thoughts are:  denied


Hallucinations:  denies auditory, denies visual


Cognition:  memory grossly intact, attention grossly intact, language grossly 

intact


Intelligence estimated to be:  average


Insight:  impaired


Judgement:  poor





Impression


Thoughts of SI and self abuse continue, but without acts. She has been accepted 

at  for Thursday but is saying that if her advisor is going to drop her from 

the program because of the medical withdrawal, then she just wants to return to 

school.  Has a phone meeting with advisor at 4 today, so will need more 

information before we proceed.





Plan





(1) Self-injurious behavior


Inpatient care is least restrictive and most appropriate setting for care at 

this time.  


Close observation and suicide checks on the unit


Monitor her first further self-injurious behaviors, naltrexone may provide some 

protection against urges.  In the future topiramate may be an option off label 

to help decrease self-injurious urges.





9/19


   - No self injurious acts





9/20


   - Patient denying suicidality here, and has not engaged in self injury, but 

no risk factors have been modified that would decrease her risk outside of the 

hospital.





9/22


   - Ongoing thoughts of SIB but no acts


9/25


   - Burn wound is healing, and patient denies further self injury.





(2) Major depressive disorder, recurrent episode with anxious distress


Patient has had multiple medication trials in the past. Further medication 

changes at this time are not likely to be fully helpful to restore her mood or 

reduce her PTSD/trauma symptoms.  However we did discuss moving naltrexone all 

to the morning to reduce its impact on sleep to include night time awakening 

and vivid dreams.  Continue Seroquel 25 mg at bedtime, citalopram 40mg daily, 

buspirone 15mg tid, clonidine 0.1mg qhs, lorazepam 0.5mg tid prn anxiety, 

lurasidone 40mg with dinner (she has not been taking it with food).





9/19


   - Recommend referral to [facility] ([facility]) for long term treatment of 

depression and PTSD.





9/20


   - Reviewed last fasting lipid profile and glucose, done Jan. 2017 and 

results were normal. Will need to be repeated in Jan. 2018.


9/21


   - Will interview with [facility] today for long term treatment


   - Continue current meds'





9/23    - given probable akathisia that could be coming from latuda now being 

taken with food, will lower latuda to 20mg with dinner after review of options.

   Propranolol reviewed but not rx'd  for now given recent BP readings.  has 

clonidine at night if blood pressure is appropriate and prn clonidine not being 

taken given bp readings.  considered cogentin prn but did not rx given past 

adverse effect, thus deferring Artane for now as well.  PT has ativan prn and 

benzo sometimes help akathisia and can take if akathisia is significantly 

bothersome with lowering of latuda main intervention  





9/24   - given refusal of latuda at dinner time despite dose lowering over 

concerns of akathisia that might occur from it, moving latuda to bedtime at 

prior dosage 40mg since tolerated it well then after review of impact of  

effective dose level when not taken with 350+ calories 


         - maintained other meds     


9/25


   - Continue current medications, and encourage patient to take Latuda, which 

she has refused the past 2 nights.


9/26


   - Still refusing latuda


   - Meeting with advisor at 4 today to discuss medical withdrawal


   - Accepted to [facility] on Thursday





(3) PTSD (post-traumatic stress disorder)


History of trauma patient would benefit from return to DBT-based therapy for 

further work.  We'll continue clonidine to reduce hyperarousal, continue Celexa

,  in the future she may benefit from a trial of topiramate to target intrusive 

nightmares.





9/21


   - Interview with [facility] today


9/22


   - Continue current plan





9/23    -latuda adjusted as above for akathisia related s/e 





9/24     -latuda adjusted to 40mg  hs dosing   (not with food) as per above 





(4) Alcohol use disorder


Patient on AWSS scale, agree with naltrexone which may help reduce self-

injurious behaviors as well as permanent alcohol abstinence.  She reported 

benefit from AA and has a sponsor as well as meetings and home group.  We will 

encourage her return to these.  She found 12-step programming at [facility] helpful.


9/19


   - Still with cravings


   - AWSS DC'd as not triggering. 





9/25


The patient's AUDIT score suggests problematic drinking (Zone III WHO).  Brief 

intervention was offered and accepted 


Intervention was greater than 5 min in length.





Brief interventions include: 1. Assess Readiness to Quit, 2. Advise: Help 

Patient to Reduce or Abstain from Alcohol, 3. Agree: Set Specific, Feasible 

Goals, 4. Assist: Anticipate barriers, Problem-Solving Solutions.  Social work 

to 5. Arrange: Referrals to appropriate treatment.  





Summary of intervention: The patient is in precontemplation stage with regards 

to transtheoretical model of change.  The patient is advised to decrease 

alcohol consumption due to depressant effects and risk of interactions with 

prescription medications.  





(5) Cannabis use disorder, mild, in early remission


Encourage abstinence. Patient is in the precontemplation stage with respect to 

this.





(6) Nicotine dependence


Continue replacement therapy with the patch, patient is precontemplation with 

respect to this.





9/19


   - Order nicotine gum





(7) Burn


Have applied bandage nursing to check daily, will see if winter nurses able to 

come this week to evaluate it Thursday or Friday.





9/18


   - Consult wound nurse.  Patient is seen by the wound clinic as an OP.





9/25


   - Nursing changed bandage on burn wound. 





(8) Hypothyroid


Continue levothyroxine as prescribed








Discharge / Aftercare Planning


Primary Care Physician:  


   Name:  [facility]


   Phone Number:  [phone]


Psychiatrist:  


   Name:  Glenna Spann PA-C


   Phone Number:  [phone]


Therapist:  


   Name:  [facility]


   Phone Number:  [phone]


   Date of Appointment:  Sep 18, 2017


   Appointment Notes:  Standing appts every Monday and Thursday at 4:00pm


:  


   Name:  .


Other:  


   Name of Appointment #1:  [facility]


   Phone Number:  [phone]





Visit Code


E&M Code:  78719





Inventory Assets


Strengths:


Reaching out to her outpatient provider prior to acting on suicidal thinking, 

good alliance with outpatient provider, and the locked unit, compliant with 

medications


Needs:


Safe place while still suicidal actively, structured programming that will help 

her with increase distress tolerance, and combating chronic suicidality and 

assisting in trauma recovery





Risk Factors Assessment


:  Yes


/single/:  Yes


Higher / Fall in social status:  No


Health problems:  No


Mental Health Diagnoses:  Yes


Substance use disorders:  Yes


Previous attempt:  Yes


Previous psychiatric stay:  Yes


Hopelessness:  Yes


Smoker:  Yes





Protective Factors Assessment


Moravian beliefs:  No


:  No


Responsible for young children:  No


Employed:  No


Stable relationships:  No


Supportive family:  No


Good rapport with provider:  Yes





Data


Vital Signs Last 24 Hrs:











  Date Time  Temp Pulse Resp B/P (MAP) Pulse Ox O2 Delivery O2 Flow Rate FiO2


 


9/26/17 06:45 36.8 57 16 86/54    





  75  94/58    


 


9/25/17 22:55  76 18 128/76    


 


9/25/17 13:04  71  104/71    





  84  94/63    








Meds Administered Last 24 Hrs:





Current Inpatient Medications








 Medications


  (Trade)  Dose


 Ordered  Sig/Anju


 Route  Start Time


 Stop Time Status Last Admin


Dose Admin


 


 Acetaminophen


  (Tylenol Tab)  650 mg  Q4H  PRN


 PO  9/16/17 16:45


 10/16/17 16:44  9/17/17 12:36


650 MG


 


 Bismuth


 Subsalicylate


  (Kaopectate Liqd)  15 ml  PRN  PRN


 PO  9/16/17 16:45


 10/16/17 16:44   


 


 


 Al Hydroxide/Mg


 Hydroxide


  (Maalox Susp)  30 ml  Q4H  PRN


 PO  9/16/17 16:45


 10/16/17 16:44   


 


 


 Magnesium


 Hydroxide


  (Milk Of


 Magnesia Susp)  30 ml  DAILY  PRN


 PO  9/16/17 16:45


 10/16/17 16:44   


 


 


 Sodium Chloride


  (Ocean Nasal


 Spray)    PRN  PRN


 NA  9/16/17 16:45


 10/16/17 16:44   


 


 


 Hydroxyzine HCl


  (Vistaril Tab)  50 mg  HSZ  PRN


 PO  9/16/17 16:45


 10/16/17 16:44   


 


 


 Hydroxyzine HCl


  (Vistaril Tab)  25 mg  Q4H  PRN


 PO  9/16/17 16:45


 10/16/17 16:44   


 


 


 Citalopram


 Hydrobromide


  (celeXA TAB)  40 mg  DAILY


 PO  9/17/17 09:00


 10/17/17 08:59  9/26/17 08:43


40 MG


 


 Clonidine HCl


  (Catapres Tab)  0.1 mg  HS


 PO  9/16/17 21:00


 10/16/17 20:59  9/25/17 23:00


0.1 MG


 


 Lactase


  (Lactaid Tab)  6,000 units  TID  PRN


 PO  9/16/17 16:45


 10/16/17 16:44  9/24/17 20:48


6,000 UNITS


 


 Levothyroxine


 Sodium


  (Synthroid Tab)  75 mcg  DAILYBB


 PO  9/17/17 08:00


 10/17/17 07:59  9/26/17 08:43


75 MCG


 


 Lorazepam


  (Ativan Tab)  0.5 mg  TID  PRN


 PO  9/16/17 16:45


 10/16/17 16:44  9/22/17 19:59


0.5 MG


 


 Quetiapine


 Fumarate


  (seroQUEL TAB)  25 mg  HS


 PO  9/16/17 22:00


 10/16/17 21:59  9/25/17 23:00


25 MG


 


 Buspirone HCl


  (BusPAR TAB)  15 mg  TID


 PO  9/16/17 22:00


 10/16/17 21:59  9/26/17 08:43


15 MG


 


 Nicotine


  (Nicoderm Cq


 21MG Patch)  1 patch  QAM


 TD  9/18/17 09:00


 10/18/17 08:59  9/26/17 08:43


1 PATCH


 


 Miscellaneous


  (Remove Nicoderm


 Patch)  1 ea  HS


 N/A  9/17/17 22:00


 10/17/17 21:59  9/23/17 21:35


1 EA


 


 Clonidine HCl


  (Catapres Tab)  0.05 mg  BID  PRN


 PO  9/17/17 14:30


 10/17/17 08:59   


 


 


 Naltrexone HCl


  (Naltrexone)  50 mg  QAM


 PO  9/18/17 09:00


 10/16/17 20:59  9/26/17 08:43


50 MG


 


 Nicotine


 Polacrilex


  (Nicorette 2MG


 Gum)  1 piece  Q1H  PRN


 MT  9/19/17 10:15


 10/19/17 10:14  9/26/17 10:47


1 PIECE


 


 Lurasidone HCl


  (Latuda Tab)  40 mg  HS


 PO  9/24/17 22:00


 10/17/17 08:59   


 








Lab Results Last 24 Hrs:


9/16/17 12:20








Red Blood Count 4.41, Mean Corpuscular Volume 93.4, Mean Corpuscular Hemoglobin 

31.5, Mean Corpuscular Hemoglobin Concent 33.7, Mean Platelet Volume 9.8, 

Neutrophils (%) (Auto) 54.8, Lymphocytes (%) (Auto) 37.5, Monocytes (%) (Auto) 

5.1, Eosinophils (%) (Auto) 2.2, Basophils (%) (Auto) 0.4, Neutrophils # (Auto) 

2.45, Lymphocytes # (Auto) 1.68, Monocytes # (Auto) 0.23, Eosinophils # (Auto) 

0.10, Basophils # (Auto) 0.02





9/16/17 12:20

















Test


  9/16/17


11:55 9/16/17


12:20


 


Urine Color YELLOW  


 


Urine Appearance CLEAR (CLEAR)  


 


Urine pH 6.0 (4.5-7.5)  


 


Urine Specific Gravity


  1.007


(1.000-1.030) 


 


 


Urine Protein NEG (NEG)  


 


Urine Glucose (UA) NEG (NEG)  


 


Urine Ketones NEG (NEG)  


 


Urine Occult Blood NEG (NEG)  


 


Urine Nitrite NEG (NEG)  


 


Urine Bilirubin NEG (NEG)  


 


Urine Urobilinogen NEG (NEG)  


 


Urine Leukocyte Esterase NEG (NEG)  


 


Urine Pregnancy Test NEG (NEG)  


 


Urine Opiates Screen NEG (NEG)  


 


Urine Methadone, Qualitative NEG (NEG)  


 


Urine Barbiturates NEG (NEG)  


 


Urine Phencyclidine (PCP)


Level NEG (NEG) 


  


 


 


Ur


Amphetamine/Methamphetamine NEG (NEG) 


  


 


 


MDMA (Ecstasy) Screen NEG (NEG)  


 


Urine Benzodiazepines Screen NEG (NEG)  


 


Urine Cocaine Metabolite NEG (NEG)  


 


Urine Marijuana (THC) NEG (NEG)  


 


White Blood Count


  


  4.48 K/uL


(4.8-10.8)


 


Red Blood Count


  


  4.41 M/uL


(4.2-5.4)


 


Hemoglobin


  


  13.9 g/dL


(12.0-16.0)


 


Hematocrit  41.2 % (37-47) 


 


Mean Corpuscular Volume


  


  93.4 fL


()


 


Mean Corpuscular Hemoglobin


  


  31.5 pg


(25-34)


 


Mean Corpuscular Hemoglobin


Concent 


  33.7 g/dl


(32-36)


 


Platelet Count


  


  218 K/uL


(130-400)


 


Mean Platelet Volume


  


  9.8 fL


(7.4-10.4)


 


Neutrophils (%) (Auto)  54.8 % 


 


Lymphocytes (%) (Auto)  37.5 % 


 


Monocytes (%) (Auto)  5.1 % 


 


Eosinophils (%) (Auto)  2.2 % 


 


Basophils (%) (Auto)  0.4 % 


 


Neutrophils # (Auto)


  


  2.45 K/uL


(1.4-6.5)


 


Lymphocytes # (Auto)


  


  1.68 K/uL


(1.2-3.4)


 


Monocytes # (Auto)


  


  0.23 K/uL


(0.11-0.59)


 


Eosinophils # (Auto)


  


  0.10 K/uL


(0-0.5)


 


Basophils # (Auto)


  


  0.02 K/uL


(0-0.2)


 


RDW Standard Deviation


  


  44.6 fL


(36.4-46.3)


 


RDW Coefficient of Variation


  


  12.9 %


(11.5-14.5)


 


Immature Granulocyte % (Auto)  0.0 % 


 


Immature Granulocyte # (Auto)


  


  0.00 K/uL


(0.00-0.02)


 


Anion Gap


  


  6.0 mmol/L


(3-11)


 


Est Creatinine Clear Calc


Drug Dose 


  84.2 ml/min 


 


 


Estimated GFR (


American) 


  112.8 


 


 


Estimated GFR (Non-


American 


  97.3 


 


 


BUN/Creatinine Ratio  11.6 (10-20) 


 


Calcium Level


  


  9.3 mg/dl


(8.5-10.1)


 


Total Bilirubin


  


  0.4 mg/dl


(0.2-1)


 


Direct Bilirubin


  


  < 0.1 mg/dl


(0-0.2)


 


Aspartate Amino Transf


(AST/SGOT) 


  14 U/L (15-37) 


 


 


Alanine Aminotransferase


(ALT/SGPT) 


  14 U/L (12-78) 


 


 


Alkaline Phosphatase


  


  49 U/L


()


 


Total Protein


  


  7.7 gm/dl


(6.4-8.2)


 


Albumin


  


  4.3 gm/dl


(3.4-5.0)


 


Thyroid Stimulating Hormone


(TSH) 


  0.701 uIu/ml


(0.300-4.500)


 


Ethyl Alcohol mg/dL


  


  < 3.0 mg/dl


(0-3)

## 2017-09-27 VITALS — SYSTOLIC BLOOD PRESSURE: 89 MMHG | HEART RATE: 66 BPM | TEMPERATURE: 98.06 F | DIASTOLIC BLOOD PRESSURE: 55 MMHG

## 2017-09-27 VITALS — SYSTOLIC BLOOD PRESSURE: 109 MMHG | HEART RATE: 98 BPM | DIASTOLIC BLOOD PRESSURE: 74 MMHG

## 2017-09-27 VITALS — HEART RATE: 102 BPM | DIASTOLIC BLOOD PRESSURE: 82 MMHG | SYSTOLIC BLOOD PRESSURE: 137 MMHG

## 2017-09-27 RX ADMIN — BUSPIRONE HYDROCHLORIDE SCH MG: 15 TABLET ORAL at 08:12

## 2017-09-27 RX ADMIN — BUSPIRONE HYDROCHLORIDE SCH MG: 15 TABLET ORAL at 14:29

## 2017-09-27 RX ADMIN — BUSPIRONE HYDROCHLORIDE SCH MG: 15 TABLET ORAL at 21:45

## 2017-09-27 RX ADMIN — NICOTINE POLACRILEX PRN PIECE: 2 GUM, CHEWING ORAL at 21:45

## 2017-09-27 RX ADMIN — CLONIDINE HYDROCHLORIDE SCH MG: 0.1 TABLET ORAL at 21:46

## 2017-09-27 RX ADMIN — ACETAMINOPHEN PRN MG: 325 TABLET ORAL at 13:38

## 2017-09-27 RX ADMIN — NICOTINE POLACRILEX PRN PIECE: 2 GUM, CHEWING ORAL at 14:38

## 2017-09-27 RX ADMIN — NICOTINE POLACRILEX PRN PIECE: 2 GUM, CHEWING ORAL at 19:16

## 2017-09-27 RX ADMIN — NICOTINE POLACRILEX PRN PIECE: 2 GUM, CHEWING ORAL at 12:00

## 2017-09-27 RX ADMIN — NICOTINE POLACRILEX PRN PIECE: 2 GUM, CHEWING ORAL at 15:44

## 2017-09-27 RX ADMIN — ACETAMINOPHEN PRN MG: 325 TABLET ORAL at 18:25

## 2017-09-27 RX ADMIN — NICOTINE POLACRILEX PRN PIECE: 2 GUM, CHEWING ORAL at 17:42

## 2017-09-27 RX ADMIN — NICOTINE POLACRILEX PRN PIECE: 2 GUM, CHEWING ORAL at 20:36

## 2017-09-27 RX ADMIN — NICOTINE POLACRILEX PRN PIECE: 2 GUM, CHEWING ORAL at 11:04

## 2017-09-27 RX ADMIN — NICOTINE POLACRILEX PRN PIECE: 2 GUM, CHEWING ORAL at 09:59

## 2017-09-27 RX ADMIN — NICOTINE POLACRILEX PRN PIECE: 2 GUM, CHEWING ORAL at 13:38

## 2017-09-27 RX ADMIN — CITALOPRAM HYDROBROMIDE SCH MG: 40 TABLET ORAL at 08:12

## 2017-09-27 RX ADMIN — LEVOTHYROXINE SODIUM SCH MCG: 75 TABLET ORAL at 08:12

## 2017-09-27 RX ADMIN — NICOTINE SCH PATCH: 21 PATCH, EXTENDED RELEASE TRANSDERMAL at 08:12

## 2017-09-27 RX ADMIN — NALTREXONE HYDROCHLORIDE SCH MG: 50 TABLET, FILM COATED ORAL at 08:12

## 2017-09-27 RX ADMIN — QUETIAPINE FUMARATE SCH MG: 25 TABLET, FILM COATED ORAL at 21:45

## 2017-09-27 NOTE — PSYCHIATRIC PROGRESS NOTES
Progress Note


Date of Service


Sep 27, 2017.





Interval History


The patient is a 26-year-old single white female with a long-standing history 

of major depressive disorder recurrent, PTSD, borderline personality structure 

with chronic suicidality and self-injurious behavior with acute intense 

suicidal ideations with plan and intention.  She did reach out to her 

outpatient provider prior to acting on her plan and has submitted to voluntary 

inpatient hospitalization.  In discussions with her she feels so depressed she 

continues to want to die but feels that she does not have the means while on 

this unit.  She shows modest future orientation and asking her staff to again 

look into [facility] so that she can resume her dialectical behavioral 

therapy and trauma therapy.





She has a chronic high risk for suicide, she is a high acute risk for suicide.





Chief Complaint


"Well I was pretty upset by the conversation".





Subjective


Patient was seen & assessed interval progress reviewed with Treatment Team. 

Staff report the patient had a meeting with her advisor yesterday and was 

informed that if she takes a medical leave, she will not be able to maintain 

her spot in the program. The patient then decided she would not go to 

[facility], saying she wanted to be discharged to return to school. She 

was tearful and upset after the meeting, got a when necessary of Ativan, and 

has been focused on wanting immediate discharge so that she can return to 

class. Her outpatient therapist was contacted and said she would continue to 

see the patient, but that it would be a mistake not to go to , and that [facility]

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  

She's admitted a 72 hour notice requesting to withdraw from treatment last night

, and also refused her scheduled Latuda.  Her outpatient psychiatric provider 

was contacted this morning, and reports concerns that the patient remains at 

high risk, and is requesting discharge, although nothing has changed, and she'

ll be returning to the same situation that she was in prior to admission.  She 

recommends that the patient pursue supportive living in the [facility], intensive case management, and substance abuse treatment.





The patient states she wants to be discharged immediately, repeatedly stating "

I need to go to class."  She says that she will "lose her college career" if 

she goes to treatment at [facility] as planned, and is not willing to do 

that. She says she talked to her therapist, and came up with "stuff I know I'm 

gonna have to do to stay healthy." This includes Monday night AA, having dinner 

with friends on Tues. and Wed., Thurs. night AA, Friday [facility] (

which she has not attended before), and Sat. night AA. She is refusing formal 

substance abuse treatment and is not interested in the [facility], but says she is 

willing for case management.  She denies suicidal thoughts, and demands to be 

discharged, stating "you can't keep me here if I'm not suicidal." She became 

increasingly tearful and agitated as the interview progressed, ultimately 

storming out of the office, saying "you guys try to control everyone's lives!" 

Staff report she then went to her room and punched a wall.





Sleep Information


Total Hours of Sleep:  6.50





Meal Information


Percent of Breakfast Consumed:  80


Percent of Lunch Consumed:  100


Percent of Dinner Consumed:  20





Mental Status Exam


During interview pt is:  alert and oriented, cooperative


Appearance:  appropriately dressed, appropriately groomed


Eye contact is:  good


Motor behavior is:  steady gait & station, no abnormal motor movements


Speech:  normal in rate, rhythm & volume


Affect:  euthymic


Mood is:  anxious


Thought process:  goal directed


Thought content:  guilt


Suicidal thought are:  denied


Homicidal thoughts are:  denied


Hallucinations:  denies auditory, denies visual


Cognition:  memory grossly intact, attention grossly intact, language grossly 

intact


Intelligence estimated to be:  average


Insight:  impaired


Judgement:  poor





Impression


Was reporting urges to burn herself yesterday, and had a difficult meeting with 

her advisor, who informed her that if she took any further time off, they could 

not hold her spot in the program.  She had been accepted at  for Thursday, 

but after the meeting with her advisor, changed her mind and said she would no 

longer go for long-term treatment, as she does not want to lose her spot in her 

program.  She has submitted a 72 hour notice requesting to withdraw from 

treatment.  Her outpatient providers recommending supervised housing (

[facility]), intensive case management, and substance abuse treatment

, as her previous plan of seeing her psychiatrist and therapist alone was 

ineffective and inadequate.  She requires inpatient treatment at this time as 

she remains at risk of harm to herself, given that her stressors have not 

changed and she is requesting to return to be exact same situation she was in 

prior to admission.  She also has continued to report urges to harm herself 

here in the hospital, is impulsive and agitated, and today punched a wall.





Plan





(1) Self-injurious behavior


Inpatient care is least restrictive and most appropriate setting for care at 

this time.  


Close observation and suicide checks on the unit


Monitor her first further self-injurious behaviors, naltrexone may provide some 

protection against urges.  In the future topiramate may be an option off label 

to help decrease self-injurious urges.





9/19


   - No self injurious acts





9/20


   - Patient denying suicidality here, and has not engaged in self injury, but 

no risk factors have been modified that would decrease her risk outside of the 

hospital.





9/22


   - Ongoing thoughts of SIB but no acts


9/25


   - Burn wound is healing, and patient denies further self injury.





(2) Major depressive disorder, recurrent episode with anxious distress


Patient has had multiple medication trials in the past. Further medication 

changes at this time are not likely to be fully helpful to restore her mood or 

reduce her PTSD/trauma symptoms.  However we did discuss moving naltrexone all 

to the morning to reduce its impact on sleep to include night time awakening 

and vivid dreams.  Continue Seroquel 25 mg at bedtime, citalopram 40mg daily, 

buspirone 15mg tid, clonidine 0.1mg qhs, lorazepam 0.5mg tid prn anxiety, 

lurasidone 40mg with dinner (she has not been taking it with food).





9/19


   - Recommend referral to [facility] ([facility]) for long term treatment of 

depression and PTSD.





9/20


   - Reviewed last fasting lipid profile and glucose, done Jan. 2017 and 

results were normal. Will need to be repeated in Jan. 2018.


9/21


   - Will interview with [facility] today for long term treatment


   - Continue current meds'





9/23    - given probable akathisia that could be coming from latuda now being 

taken with food, will lower latuda to 20mg with dinner after review of options.

   Propranolol reviewed but not rx'd  for now given recent BP readings.  has 

clonidine at night if blood pressure is appropriate and prn clonidine not being 

taken given bp readings.  considered cogentin prn but did not rx given past 

adverse effect, thus deferring Artane for now as well.  PT has ativan prn and 

benzo sometimes help akathisia and can take if akathisia is significantly 

bothersome with lowering of latuda main intervention  





9/24   - given refusal of latuda at dinner time despite dose lowering over 

concerns of akathisia that might occur from it, moving latuda to bedtime at 

prior dosage 40mg since tolerated it well then after review of impact of  

effective dose level when not taken with 350+ calories 


         - maintained other meds     


9/25


   - Continue current medications, and encourage patient to take Latuda, which 

she has refused the past 2 nights.


9/26


   - Still refusing latuda


   - Meeting with advisor at 4 today to discuss medical withdrawal


   - Accepted to [facility] on Thursday 9/27


   - Again refused Latuda, but told nursing staff she would try taking it with 

dinner today, so timing was changed.


   - Now refusing to go to [facility] for long-term treatment, and 

requesting discharge.  She has admitted a 72 hour notice.  Reviewed concerns 

with this plan with the patient and her outpatient psychiatric provider, and 

are recommending a higher level of care, including CR[facility] referral, substance 

treatment, and intensive case management.  Patient initially dismissed these 

recommendations, but was encouraged to take some time to think about this, to 

make decisions so that she can be healthy in the future and achieve her goals.





(3) PTSD (post-traumatic stress disorder)


History of trauma patient would benefit from return to DBT-based therapy for 

further work.  We'll continue clonidine to reduce hyperarousal, continue Celexa

,  in the future she may benefit from a trial of topiramate to target intrusive 

nightmares.





9/21


   - Interview with [facility] today


9/22


   - Continue current plan





9/23    -latuda adjusted as above for akathisia related s/e 





9/24     -latuda adjusted to 40mg hs dosing (not with food) as per above 





(4) Alcohol use disorder


Patient on AWSS scale, agree with naltrexone which may help reduce self-

injurious behaviors as well as permanent alcohol abstinence.  She reported 

benefit from AA and has a sponsor as well as meetings and home group.  We will 

encourage her return to these.  She found 12-step programming at [facility] helpful.


9/19


   - Still with cravings


   - AWSS DC'd as not triggering. 





9/25


The patient's AUDIT score suggests problematic drinking (Zone III WHO).  Brief 

intervention was offered and accepted 


Intervention was greater than 5 min in length.





Brief interventions include: 1. Assess Readiness to Quit, 2. Advise: Help 

Patient to Reduce or Abstain from Alcohol, 3. Agree: Set Specific, Feasible 

Goals, 4. Assist: Anticipate barriers, Problem-Solving Solutions.  Social work 

to 5. Arrange: Referrals to appropriate treatment.  





Summary of intervention: The patient is in precontemplation stage with regards 

to transtheoretical model of change.  The patient is advised to decrease 

alcohol consumption due to depressant effects and risk of interactions with 

prescription medications.  





(5) Cannabis use disorder, mild, in early remission


Encourage abstinence. Patient is in the precontemplation stage with respect to 

this.





(6) Nicotine dependence


Continue replacement therapy with the patch, patient is precontemplation with 

respect to this.





9/19


   - Order nicotine gum





(7) Burn


Have applied bandage nursing to check daily, will see if winter nurses able to 

come this week to evaluate it Thursday or Friday.





9/18


   - Consult wound nurse.  Patient is seen by the wound clinic as an OP.





9/25


   - Nursing changed bandage on burn wound. 





(8) Hypothyroid


Continue levothyroxine as prescribed








Discharge / Aftercare Planning


Primary Care Physician:  


   Name:  [facility]


   Phone Number:  [phone]


Psychiatrist:  


   Name:  Glenna Spann PA-C


   Phone Number:  [phone]


Therapist:  


   Name:  [facility]


   Phone Number:  [phone]


   Date of Appointment:  Sep 18, 2017


   Appointment Notes:  Standing appts every Monday and Thursday at 4:00pm


:  


   Name:  .


Other:  


   Name of Appointment #1:  [facility]


   Phone Number:  [phone]





Visit Code


E&M Code:  86938





Inventory Assets


Strengths:


Reaching out to her outpatient provider prior to acting on suicidal thinking, 

good alliance with outpatient provider, and the locked unit, compliant with 

medications


Needs:


Safe place while still suicidal actively, structured programming that will help 

her with increase distress tolerance, and combating chronic suicidality and 

assisting in trauma recovery





Risk Factors Assessment


:  Yes


/single/:  Yes


Higher / Fall in social status:  No


Health problems:  No


Mental Health Diagnoses:  Yes


Substance use disorders:  Yes


Previous attempt:  Yes


Previous psychiatric stay:  Yes


Hopelessness:  Yes


Smoker:  Yes





Protective Factors Assessment


Mormon beliefs:  No


:  No


Responsible for young children:  No


Employed:  No


Stable relationships:  No


Supportive family:  No


Good rapport with provider:  Yes





Data


Vital Signs Last 24 Hrs:











  Date Time  Temp Pulse Resp B/P (MAP) Pulse Ox O2 Delivery O2 Flow Rate FiO2


 


9/27/17 06:52 36.7 66 16 94/59    





  76  89/55    


 


9/26/17 22:17  76  136/72    


 


9/26/17 20:59  67 18 111/73    


 


9/26/17 12:47  63 14 104/53

## 2017-09-28 VITALS — SYSTOLIC BLOOD PRESSURE: 92 MMHG | HEART RATE: 62 BPM | TEMPERATURE: 98.06 F | DIASTOLIC BLOOD PRESSURE: 58 MMHG

## 2017-09-28 RX ADMIN — NICOTINE SCH PATCH: 21 PATCH, EXTENDED RELEASE TRANSDERMAL at 07:46

## 2017-09-28 RX ADMIN — NICOTINE POLACRILEX PRN PIECE: 2 GUM, CHEWING ORAL at 08:52

## 2017-09-28 RX ADMIN — LEVOTHYROXINE SODIUM SCH MCG: 75 TABLET ORAL at 07:45

## 2017-09-28 RX ADMIN — BUSPIRONE HYDROCHLORIDE SCH MG: 15 TABLET ORAL at 07:46

## 2017-09-28 RX ADMIN — NICOTINE POLACRILEX PRN PIECE: 2 GUM, CHEWING ORAL at 07:47

## 2017-09-28 RX ADMIN — CITALOPRAM HYDROBROMIDE SCH MG: 40 TABLET ORAL at 07:46

## 2017-09-28 RX ADMIN — NALTREXONE HYDROCHLORIDE SCH MG: 50 TABLET, FILM COATED ORAL at 07:46

## 2017-09-28 NOTE — DISCHARGE INSTRUCTIONS
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 "fine," and affect is incongruent, irritable, but stable.  Thoughts are 

goal directed.  The patient denied suicidal and homicidal ideation and was able 

to review her safety plan.  No paranoia, delusions, or hallucinations, and did 

not appear to be responding to internal stimuli.  Cognition was grossly intact.

  Alert and oriented to person, place and time.  Intelligence is consistent 

with level of education.  Insight and and judgment are limited.





Laboratory











Test


  17


11:55 17


12:20


 


Urine Color YELLOW  


 


Urine Appearance CLEAR  


 


Urine pH 6.0  


 


Urine Specific Gravity 1.007  


 


Urine Protein NEG  


 


Urine Glucose (UA) NEG  


 


Urine Ketones NEG  


 


Urine Occult Blood NEG  


 


Urine Nitrite NEG  


 


Urine Bilirubin NEG  


 


Urine Urobilinogen NEG  


 


Urine Leukocyte Esterase NEG  


 


Urine Pregnancy Test NEG  


 


Urine Opiates Screen NEG  


 


Urine Methadone, Qualitative NEG  


 


Urine Barbiturates NEG  


 


Urine Phencyclidine (PCP)


Level NEG 


  


 


 


Ur


Amphetamine/Methamphetamine NEG 


  


 


 


MDMA (Ecstasy) Screen NEG  


 


Urine Benzodiazepines Screen NEG  


 


Urine Cocaine Metabolite NEG  


 


Urine Marijuana (THC) NEG  


 


White Blood Count  4.48 


 


Red Blood Count  4.41 


 


Hemoglobin  13.9 


 


Hematocrit  41.2 


 


Mean Corpuscular Volume  93.4 


 


Mean Corpuscular Hemoglobin  31.5 


 


Mean Corpuscular Hemoglobin


Concent 


  33.7 


 


 


Platelet Count  218 


 


Mean Platelet Volume  9.8 


 


Neutrophils (%) (Auto)  54.8 


 


Lymphocytes (%) (Auto)  37.5 


 


Monocytes (%) (Auto)  5.1 


 


Eosinophils (%) (Auto)  2.2 


 


Basophils (%) (Auto)  0.4 


 


Neutrophils # (Auto)  2.45 


 


Lymphocytes # (Auto)  1.68 


 


Monocytes # (Auto)  0.23 


 


Eosinophils # (Auto)  0.10 


 


Basophils # (Auto)  0.02 


 


RDW Standard Deviation  44.6 


 


RDW Coefficient of Variation  12.9 


 


Immature Granulocyte % (Auto)  0.0 


 


Immature Granulocyte # (Auto)  0.00 


 


Sodium Level  140 


 


Potassium Level  3.8 


 


Chloride Level  107 


 


Carbon Dioxide Level  27 


 


Anion Gap  6.0 


 


Blood Urea Nitrogen  10 


 


Creatinine  0.83 


 


Est Creatinine Clear Calc


Drug Dose 


  84.2 


 


 


Estimated GFR (


American) 


  112.8 


 


 


Estimated GFR (Non-


American 


  97.3 


 


 


BUN/Creatinine Ratio  11.6 


 


Random Glucose  114 


 


Calcium Level  9.3 


 


Total Bilirubin  0.4 


 


Direct Bilirubin  < 0.1 


 


Aspartate Amino Transferase


(AST) 


  14 


 


 


Alanine Aminotransferase (ALT)  14 


 


Alkaline Phosphatase  49 


 


Total Protein  7.7 


 


Albumin  4.3 


 


Thyroid Stimulating Hormone


(TSH) 


  0.701 


 


 


Ethyl Alcohol mg/dL  < 3.0 











Total Time


Total Time Spent (min):  Greater than 30 minutes


Total Time Included:  examination of the patient, discharge planning, 

medication reconciliation





Tobacco Cessation at Discharge


Smoking Status:  Current Every Day Smoker (1ppd)


FDA approved Prescription:  declined med & out pt counseling (patient has no 

interest in smoking cessation, and plans to resume smoking.)

## 2018-03-13 ENCOUNTER — HOSPITAL ENCOUNTER (EMERGENCY)
Dept: HOSPITAL 45 - C.EDB | Age: 28
Discharge: HOME | End: 2018-03-13
Payer: COMMERCIAL

## 2018-03-13 VITALS
BODY MASS INDEX: 28.57 KG/M2 | HEIGHT: 60 IN | BODY MASS INDEX: 28.57 KG/M2 | WEIGHT: 145.51 LBS | WEIGHT: 145.51 LBS | HEIGHT: 60 IN

## 2018-03-13 VITALS — OXYGEN SATURATION: 97 % | SYSTOLIC BLOOD PRESSURE: 117 MMHG | HEART RATE: 71 BPM | DIASTOLIC BLOOD PRESSURE: 71 MMHG

## 2018-03-13 VITALS — TEMPERATURE: 98.24 F
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
## 2018-03-13 DIAGNOSIS — F43.10: ICD-10-CM

## 2018-03-13 DIAGNOSIS — E03.9: ICD-10-CM

## 2018-03-13 DIAGNOSIS — F12.90: ICD-10-CM

## 2018-03-13 DIAGNOSIS — F41.8: ICD-10-CM

## 2018-03-13 DIAGNOSIS — S60.511A: Primary | ICD-10-CM

## 2018-03-13 DIAGNOSIS — F17.200: ICD-10-CM

## 2018-03-13 NOTE — EMERGENCY ROOM VISIT NOTE
History


First contact with patient:  17:29


Chief Complaint:  HAND PAIN/INJURY


Stated Complaint:  POSSIBLY BROKEN RIGHT HAND





History of Present Illness


The patient is a 27 year old female who presents to the Emergency Room via 

private vehicle with complaints of "possibly broken right hand".  The patient 

states that around 4 PM today she was angry and punched a concrete wall in 

[facility].  She is right-handed.  She notes pain in the third and 

fourth MCP joint regions.  She rates the pain as a 7/10.  Her tetanus is up-to-

date.  She does request something for the pain.  She notes no wrist pain.  

There is no numbness or tingling.  She denies any suicidal or homicidal 

ideation.





Review of Systems


A complete 6-point Review of Systems was discussed with the patient, with 

pertinent positives and negatives listed in the History of Present Illness. All 

remaining Review of Systems questions can be considered negative unless 

otherwise specified.





Past Medical/Surgical History


Medical Problems:


(1) Alcohol use disorder


(2) Anxiety


(3) Borderline personality disorder


(4) Cannabis use disorder, mild, in early remission


(5) Depression


(6) Depression


(7) Hypothyroid


(8) Hypothyroidism


(9) Major depressive disorder, recurrent episode with anxious distress


(10) Nicotine dependence


(11) Post traumatic stress disorder


(12) PTSD (post-traumatic stress disorder)


(13) Self-injurious behavior


Social History Problems:


(1) Self-injurious behavior








Family History





Patient reports no known family medical history.





Social History


Smoking Status:  Current Every Day Smoker


Alcohol Use:  none


Drug Use:  marijuana


Marital Status:  single


Housing Status:  lives with family


Occupation Status:  employed





Current/Historical Medications


Scheduled


Amitriptyline HCl (Amitriptyline HCl), 25 MG PO HS


Bupropion HCl (Bupropion HCl), 75 MG PO QD@1200


Buspirone HCl (Buspirone HCl), 15 MG PO BID


Citalopram (Citalopram Hydrobromide), 40 MG PO QAM


Clonidine Hcl (Catapres), 0.2 MG PO HS


Levothyroxine Sodium (Levothyroxine Sodium), 75 MCG PO QAM





Scheduled PRN


Lactase (Lactaid), 6,000 UNITS PO TID PRN for GI Upset


Lorazepam (Lorazepam), 0.5 MG PO DAILY PRN for Anxiety





Physical Exam


Vital Signs











  Date Time  Temp Pulse Resp B/P (MAP) Pulse Ox O2 Delivery O2 Flow Rate FiO2


 


3/13/18 17:27 36.8 87 20 131/82 98 Room Air  











Physical Exam


VITAL SIGNS - Vital signs and nursing notes were reviewed.  Stable.  Afebrile.


GENERAL -27-year-old female appearing her stated age who is in no acute 

distress. Communicates well with provider and answers questions appropriately.


SKIN - Without rashes.  No petechial or meningeal rash.  There is edema as well 

as abrasions noted overlying the second and third MCP joint of the patient's 

right hand.  No bleeding.


HEAD - NC/AT.  No evidence of trauma.


EYES - Sclera anicteric. 


EARS - No deformities of external structures noted on gross examination 

bilaterally. 


NOSE - Midline and without cyanosis. No epistaxis or purulent drainage noted. 


MOUTH/OROPHARYNX - Without perioral cyanosis.


EXTREMITIES - No clubbing or peripheral cyanosis. No pretibial edema present.  

Skin changes as above.  There is tenderness overlying the second and third MCP 

joints of the patient's right hand.  No wrist tenderness or finger tenderness 

distally.  She is neurovascularly intact distally.  No bleeding.  Decreased 

range of motion secondary to pain.  


PSYCH - A&O, and cooperates fully with examiner. Pt is very pleasant and 

interacts well with examiner.





Medical Decision & Procedures


ER Provider


Diagnostic Interpretation:


RIGHT HAND 3 VIEWS





HISTORY:      Punched wall, R hand pain at 4th and 5th MCP joints





COMPARISON: None.





FINDINGS: There is no fracture or dislocation. Mild soft tissue swelling at the


fourth and fifth MCP joints. No radiopaque foreign bodies.





IMPRESSION:  


No fracture or dislocation within the right hand.











Electronically signed by:  Lokesh Bruno M.D.


3/13/2018 6:20 PM





Dictated Date/Time:  3/13/2018 6:17 PM





Medications Administered











 Medications


  (Trade)  Dose


 Ordered  Sig/Anju


 Route  Start Time


 Stop Time Status Last Admin


Dose Admin


 


 Ibuprofen


  (Motrin Tab)  600 mg  NOW  STAT


 PO  3/13/18 17:35


 3/13/18 17:37 DC 3/13/18 17:41


600 MG











Medical Decision


Patient was seen and evaluated as above.  She presents to us today with right 

hand pain status post punching a wall.  She denies any suicidal or homicidal 

ideations.  Review was performed of nursing notes and vital signs. After 

obtaining a thorough history and physical examination the above work up was 

performed.  X-ray negative.  I did cleanse and bandage the wounds and applied a 

Ace wrap for her symptoms.  She is to follow-up orthopedics if pain persists 

because of potential occult fracture.  She was given ibuprofen here for pain 

and ice packs.  The patient was educated upon management, had questions 

answered prior to discharge, and was discharged home in good condition.  I 

suspect soft tissue contusion.





In the evaluation and treatment of this patient the following differential 

diagnoses were obtained: Fracture, dislocation, contusion, bruise/abrasion, 

among others.





Impression





 Primary Impression:  


 Hand pain, right





Departure Information


Dispostion


Home / Self-Care





Condition


GOOD





Referrals


[facility] (PCP)








Gee Coleman, DO





Patient Instructions


My [facility]





Additional Instructions





You have been treated in the Emergency Department for hand Pain. 





For pain control, you can use the following over-the-counter medicines:





- Regular strength (325mg/tab) Tylenol (acetaminophen) 2 tabs every 4-6 hours 

as needed. Do not exceed 12 tablets in a 24 hour period. Avoid taking more than 

3 grams (3000 mg) of Tylenol per day. This includes any other sources of 

acetaminophen you may take on a regular basis.





- Regular strength (200 mg/tab) Advil (ibuprofen) 1-2 tabs every 4-6 hours as 

needed. Do not exceed a dose of 3200 mg per day.





If this is a recent injury (<24 hrs), ice can be applied to the area of pain 

for the first 3 days to help decrease pain and inflammation.





You have been provided the number for an Orthopaedic Surgeon. You should call 

this number as soon as possible to establish a follow-up visit from today's 

Emergency Department visit.





Keep the bandage in place until evaluated by Orthopedics (Change it daily until 

skin heals).





Return to the Emergency Department if your current symptoms worsen despite 

treatment course outlined above, or if you develop any of the following symptoms

: intractable pain despite aforementioned treatment course or new onset of 

numbness or tingling of the fingers.

## 2018-03-13 NOTE — DIAGNOSTIC IMAGING REPORT
RIGHT HAND 3 VIEWS



HISTORY:      Punched wall, R hand pain at 4th and 5th MCP joints



COMPARISON: None.



FINDINGS: There is no fracture or dislocation. Mild soft tissue swelling at the

fourth and fifth MCP joints. No radiopaque foreign bodies.



IMPRESSION:  

No fracture or dislocation within the right hand.







Electronically signed by:  Lokesh Bruno M.D.

3/13/2018 6:20 PM



Dictated Date/Time:  3/13/2018 6:17 PM